# Patient Record
Sex: FEMALE | Race: WHITE | NOT HISPANIC OR LATINO | URBAN - METROPOLITAN AREA
[De-identification: names, ages, dates, MRNs, and addresses within clinical notes are randomized per-mention and may not be internally consistent; named-entity substitution may affect disease eponyms.]

---

## 2017-05-29 ENCOUNTER — EMERGENCY (EMERGENCY)
Facility: HOSPITAL | Age: 27
LOS: 0 days | Discharge: HOME | End: 2017-05-29

## 2017-06-28 DIAGNOSIS — R11.0 NAUSEA: ICD-10-CM

## 2017-06-28 DIAGNOSIS — R10.13 EPIGASTRIC PAIN: ICD-10-CM

## 2017-06-28 DIAGNOSIS — R19.7 DIARRHEA, UNSPECIFIED: ICD-10-CM

## 2017-06-28 DIAGNOSIS — Z87.891 PERSONAL HISTORY OF NICOTINE DEPENDENCE: ICD-10-CM

## 2018-08-28 PROBLEM — Z00.00 ENCOUNTER FOR PREVENTIVE HEALTH EXAMINATION: Status: ACTIVE | Noted: 2018-08-28

## 2018-09-13 ENCOUNTER — APPOINTMENT (OUTPATIENT)
Dept: OBGYN | Facility: CLINIC | Age: 28
End: 2018-09-13

## 2018-09-13 ENCOUNTER — OUTPATIENT (OUTPATIENT)
Dept: OUTPATIENT SERVICES | Facility: HOSPITAL | Age: 28
LOS: 1 days | Discharge: HOME | End: 2018-09-13

## 2018-09-13 ENCOUNTER — NON-APPOINTMENT (OUTPATIENT)
Age: 28
End: 2018-09-13

## 2018-09-13 VITALS
DIASTOLIC BLOOD PRESSURE: 60 MMHG | BODY MASS INDEX: 27.29 KG/M2 | WEIGHT: 154 LBS | HEIGHT: 63 IN | SYSTOLIC BLOOD PRESSURE: 100 MMHG

## 2018-09-13 DIAGNOSIS — K59.00 CONSTIPATION, UNSPECIFIED: ICD-10-CM

## 2018-09-13 DIAGNOSIS — O35.8XX1 MATERNAL CARE FOR OTHER (SUSPECTED) FETAL ABNORMALITY AND DAMAGE, FETUS 1: ICD-10-CM

## 2018-09-13 DIAGNOSIS — Z34.90 ENCOUNTER FOR SUPERVISION OF NORMAL PREGNANCY, UNSPECIFIED, UNSPECIFIED TRIMESTER: ICD-10-CM

## 2018-09-14 ENCOUNTER — APPOINTMENT (OUTPATIENT)
Dept: OBGYN | Facility: CLINIC | Age: 28
End: 2018-09-14

## 2018-09-16 LAB
BILIRUB UR QL STRIP: NORMAL
C TRACH RRNA SPEC QL NAA+PROBE: NOT DETECTED
CLARITY UR: CLEAR
COLLECTION METHOD: NORMAL
GLUCOSE UR-MCNC: NORMAL
HCG UR QL: 0.2 EU/DL
HGB UR QL STRIP.AUTO: NORMAL
KETONES UR-MCNC: NORMAL
LEUKOCYTE ESTERASE UR QL STRIP: NORMAL
N GONORRHOEA RRNA SPEC QL NAA+PROBE: NOT DETECTED
NITRITE UR QL STRIP: NORMAL
PH UR STRIP: 7
PROT UR STRIP-MCNC: NORMAL
SOURCE AMPLIFICATION: NORMAL
SP GR UR STRIP: 1.01

## 2018-10-01 ENCOUNTER — APPOINTMENT (OUTPATIENT)
Dept: ANTEPARTUM | Facility: CLINIC | Age: 28
End: 2018-10-01

## 2018-10-03 LAB
ABO + RH PNL BLD: NORMAL
BACTERIA UR CULT: NORMAL
BASOPHILS # BLD AUTO: 0.02 K/UL
BASOPHILS NFR BLD AUTO: 0.2 %
BLD GP AB SCN SERPL QL: NORMAL
EOSINOPHIL # BLD AUTO: 0.07 K/UL
EOSINOPHIL NFR BLD AUTO: 0.7 %
HCT VFR BLD CALC: 40.1 %
HGB BLD-MCNC: 12.9 G/DL
IMM GRANULOCYTES NFR BLD AUTO: 0.4 %
LEAD BLD-MCNC: <1 UG/DL
LYMPHOCYTES # BLD AUTO: 1.45 K/UL
LYMPHOCYTES NFR BLD AUTO: 14.4 %
MAN DIFF?: NORMAL
MCHC RBC-ENTMCNC: 28.5 PG
MCHC RBC-ENTMCNC: 32.2 G/DL
MCV RBC AUTO: 88.5 FL
MONOCYTES # BLD AUTO: 0.47 K/UL
MONOCYTES NFR BLD AUTO: 4.7 %
NEUTROPHILS # BLD AUTO: 8.05 K/UL
NEUTROPHILS NFR BLD AUTO: 79.6 %
PLATELET # BLD AUTO: 267 K/UL
RBC # BLD: 4.53 M/UL
RBC # FLD: 13.1 %
T PALLIDUM AB SER QL IA: NEGATIVE
VZV AB TITR SER: POSITIVE
VZV IGG SER IF-ACNC: 589.9 INDEX
WBC # FLD AUTO: 10.1 K/UL

## 2018-10-04 LAB
AR GENE MUT ANL BLD/T: NEGATIVE
CFTR MUT TESTED BLD/T: NEGATIVE

## 2018-10-11 ENCOUNTER — NON-APPOINTMENT (OUTPATIENT)
Age: 28
End: 2018-10-11

## 2018-10-11 ENCOUNTER — OUTPATIENT (OUTPATIENT)
Dept: OUTPATIENT SERVICES | Facility: HOSPITAL | Age: 28
LOS: 1 days | Discharge: HOME | End: 2018-10-11

## 2018-10-11 ENCOUNTER — APPOINTMENT (OUTPATIENT)
Dept: OBGYN | Facility: CLINIC | Age: 28
End: 2018-10-11

## 2018-10-11 ENCOUNTER — RESULT CHARGE (OUTPATIENT)
Age: 28
End: 2018-10-11

## 2018-10-11 VITALS — SYSTOLIC BLOOD PRESSURE: 98 MMHG | DIASTOLIC BLOOD PRESSURE: 62 MMHG | WEIGHT: 155 LBS | BODY MASS INDEX: 27.46 KG/M2

## 2018-10-11 DIAGNOSIS — Z34.82 ENCOUNTER FOR SUPERVISION OF OTHER NORMAL PREGNANCY, SECOND TRIMESTER: ICD-10-CM

## 2018-10-11 DIAGNOSIS — Z3A.27 27 WEEKS GESTATION OF PREGNANCY: ICD-10-CM

## 2018-10-15 LAB
BILIRUB UR QL STRIP: NEGATIVE
CLARITY UR: CLEAR
COLLECTION METHOD: NORMAL
GLUCOSE UR-MCNC: NEGATIVE
HCG UR QL: NEGATIVE EU/DL
HGB UR QL STRIP.AUTO: NEGATIVE
KETONES UR-MCNC: NEGATIVE
LEUKOCYTE ESTERASE UR QL STRIP: NEGATIVE
NITRITE UR QL STRIP: NEGATIVE
PH UR STRIP: 7
PROT UR STRIP-MCNC: NEGATIVE
SP GR UR STRIP: 1.03

## 2018-10-16 LAB — GLUCOSE 1H P 50 G GLC PO SERPL-MCNC: 98 MG/DL

## 2018-10-25 ENCOUNTER — APPOINTMENT (OUTPATIENT)
Dept: OBGYN | Facility: CLINIC | Age: 28
End: 2018-10-25

## 2018-10-25 ENCOUNTER — RESULT CHARGE (OUTPATIENT)
Age: 28
End: 2018-10-25

## 2018-10-25 ENCOUNTER — OUTPATIENT (OUTPATIENT)
Dept: OUTPATIENT SERVICES | Facility: HOSPITAL | Age: 28
LOS: 1 days | Discharge: HOME | End: 2018-10-25

## 2018-10-25 ENCOUNTER — NON-APPOINTMENT (OUTPATIENT)
Age: 28
End: 2018-10-25

## 2018-10-25 VITALS — WEIGHT: 158 LBS | DIASTOLIC BLOOD PRESSURE: 56 MMHG | BODY MASS INDEX: 27.99 KG/M2 | SYSTOLIC BLOOD PRESSURE: 90 MMHG

## 2018-10-30 DIAGNOSIS — Z3A.29 29 WEEKS GESTATION OF PREGNANCY: ICD-10-CM

## 2018-10-30 DIAGNOSIS — Z34.83 ENCOUNTER FOR SUPERVISION OF OTHER NORMAL PREGNANCY, THIRD TRIMESTER: ICD-10-CM

## 2018-10-30 DIAGNOSIS — O36.8130 DECREASED FETAL MOVEMENTS, THIRD TRIMESTER, NOT APPLICABLE OR UNSPECIFIED: ICD-10-CM

## 2018-10-30 LAB
BILIRUB UR QL STRIP: NEGATIVE
CLARITY UR: CLEAR
COLLECTION METHOD: NORMAL
GLUCOSE UR-MCNC: NEGATIVE
HCG UR QL: NEGATIVE EU/DL
HGB UR QL STRIP.AUTO: NEGATIVE
KETONES UR-MCNC: NEGATIVE
LEUKOCYTE ESTERASE UR QL STRIP: NEGATIVE
NITRITE UR QL STRIP: NEGATIVE
PH UR STRIP: 6
PROT UR STRIP-MCNC: NEGATIVE
SP GR UR STRIP: 1.02

## 2018-11-15 ENCOUNTER — OUTPATIENT (OUTPATIENT)
Dept: OUTPATIENT SERVICES | Facility: HOSPITAL | Age: 28
LOS: 1 days | Discharge: HOME | End: 2018-11-15

## 2018-11-15 ENCOUNTER — APPOINTMENT (OUTPATIENT)
Dept: OBGYN | Facility: CLINIC | Age: 28
End: 2018-11-15

## 2018-11-15 ENCOUNTER — NON-APPOINTMENT (OUTPATIENT)
Age: 28
End: 2018-11-15

## 2018-11-15 ENCOUNTER — RESULT CHARGE (OUTPATIENT)
Age: 28
End: 2018-11-15

## 2018-11-15 VITALS
SYSTOLIC BLOOD PRESSURE: 120 MMHG | DIASTOLIC BLOOD PRESSURE: 60 MMHG | WEIGHT: 166 LBS | HEIGHT: 63 IN | BODY MASS INDEX: 29.41 KG/M2

## 2018-11-18 LAB
BILIRUB UR QL STRIP: NORMAL
CLARITY UR: CLEAR
COLLECTION METHOD: NORMAL
GLUCOSE UR-MCNC: NORMAL
HCG UR QL: 0.2 EU/DL
HGB UR QL STRIP.AUTO: NORMAL
KETONES UR-MCNC: NORMAL
LEUKOCYTE ESTERASE UR QL STRIP: NORMAL
NITRITE UR QL STRIP: NORMAL
PH UR STRIP: 6
PROT UR STRIP-MCNC: NORMAL
SP GR UR STRIP: 1.01

## 2018-11-28 DIAGNOSIS — Z3A.32 32 WEEKS GESTATION OF PREGNANCY: ICD-10-CM

## 2018-11-28 DIAGNOSIS — Z34.83 ENCOUNTER FOR SUPERVISION OF OTHER NORMAL PREGNANCY, THIRD TRIMESTER: ICD-10-CM

## 2018-11-29 ENCOUNTER — APPOINTMENT (OUTPATIENT)
Dept: OBGYN | Facility: CLINIC | Age: 28
End: 2018-11-29

## 2018-12-03 ENCOUNTER — OUTPATIENT (OUTPATIENT)
Dept: OUTPATIENT SERVICES | Facility: HOSPITAL | Age: 28
LOS: 1 days | Discharge: HOME | End: 2018-12-03

## 2018-12-03 ENCOUNTER — APPOINTMENT (OUTPATIENT)
Dept: ANTEPARTUM | Facility: CLINIC | Age: 28
End: 2018-12-03

## 2018-12-13 ENCOUNTER — OUTPATIENT (OUTPATIENT)
Dept: OUTPATIENT SERVICES | Facility: HOSPITAL | Age: 28
LOS: 1 days | Discharge: HOME | End: 2018-12-13

## 2018-12-13 ENCOUNTER — APPOINTMENT (OUTPATIENT)
Dept: OBGYN | Facility: CLINIC | Age: 28
End: 2018-12-13

## 2018-12-13 VITALS — BODY MASS INDEX: 31 KG/M2 | SYSTOLIC BLOOD PRESSURE: 100 MMHG | DIASTOLIC BLOOD PRESSURE: 70 MMHG | WEIGHT: 175 LBS

## 2018-12-14 DIAGNOSIS — Z34.90 ENCOUNTER FOR SUPERVISION OF NORMAL PREGNANCY, UNSPECIFIED, UNSPECIFIED TRIMESTER: ICD-10-CM

## 2018-12-14 DIAGNOSIS — Z34.83 ENCOUNTER FOR SUPERVISION OF OTHER NORMAL PREGNANCY, THIRD TRIMESTER: ICD-10-CM

## 2018-12-14 DIAGNOSIS — Z3A.36 36 WEEKS GESTATION OF PREGNANCY: ICD-10-CM

## 2018-12-14 LAB
BILIRUB UR QL STRIP: NORMAL
CLARITY UR: CLEAR
COLLECTION METHOD: NORMAL
GLUCOSE UR-MCNC: NORMAL
HCG UR QL: 0.2 EU/DL
HGB UR QL STRIP.AUTO: NORMAL
KETONES UR-MCNC: NORMAL
LEUKOCYTE ESTERASE UR QL STRIP: NORMAL
NITRITE UR QL STRIP: NORMAL
PH UR STRIP: 6.5
PROT UR STRIP-MCNC: NORMAL
SP GR UR STRIP: 1.03

## 2018-12-21 ENCOUNTER — APPOINTMENT (OUTPATIENT)
Dept: OBGYN | Facility: CLINIC | Age: 28
End: 2018-12-21

## 2018-12-21 ENCOUNTER — NON-APPOINTMENT (OUTPATIENT)
Age: 28
End: 2018-12-21

## 2018-12-21 ENCOUNTER — OUTPATIENT (OUTPATIENT)
Dept: OUTPATIENT SERVICES | Facility: HOSPITAL | Age: 28
LOS: 1 days | Discharge: HOME | End: 2018-12-21

## 2018-12-21 VITALS — BODY MASS INDEX: 30.82 KG/M2 | DIASTOLIC BLOOD PRESSURE: 60 MMHG | SYSTOLIC BLOOD PRESSURE: 100 MMHG | WEIGHT: 174 LBS

## 2018-12-21 LAB
BILIRUB UR QL STRIP: NEGATIVE
CLARITY UR: CLEAR
COLLECTION METHOD: NORMAL
GLUCOSE UR-MCNC: NEGATIVE
HCG UR QL: NORMAL EU/DL
HGB UR QL STRIP.AUTO: NEGATIVE
KETONES UR-MCNC: NEGATIVE
LEUKOCYTE ESTERASE UR QL STRIP: NORMAL
NITRITE UR QL STRIP: NEGATIVE
PH UR STRIP: 8
PROT UR STRIP-MCNC: NEGATIVE
SP GR UR STRIP: 1.02

## 2018-12-26 DIAGNOSIS — Z34.83 ENCOUNTER FOR SUPERVISION OF OTHER NORMAL PREGNANCY, THIRD TRIMESTER: ICD-10-CM

## 2018-12-26 LAB
C TRACH RRNA SPEC QL NAA+PROBE: NOT DETECTED
GP B STREP DNA SPEC QL NAA+PROBE: NORMAL
GP B STREP DNA SPEC QL NAA+PROBE: NOT DETECTED
N GONORRHOEA RRNA SPEC QL NAA+PROBE: NOT DETECTED
SOURCE AMPLIFICATION: NORMAL
SOURCE GBS: NORMAL

## 2018-12-27 ENCOUNTER — OUTPATIENT (OUTPATIENT)
Dept: OUTPATIENT SERVICES | Facility: HOSPITAL | Age: 28
LOS: 1 days | Discharge: HOME | End: 2018-12-27

## 2018-12-27 ENCOUNTER — NON-APPOINTMENT (OUTPATIENT)
Age: 28
End: 2018-12-27

## 2018-12-27 ENCOUNTER — APPOINTMENT (OUTPATIENT)
Dept: OBGYN | Facility: CLINIC | Age: 28
End: 2018-12-27

## 2018-12-27 ENCOUNTER — RESULT CHARGE (OUTPATIENT)
Age: 28
End: 2018-12-27

## 2018-12-27 VITALS
HEART RATE: 88 BPM | TEMPERATURE: 98 F | SYSTOLIC BLOOD PRESSURE: 106 MMHG | DIASTOLIC BLOOD PRESSURE: 72 MMHG | RESPIRATION RATE: 17 BRPM

## 2018-12-27 VITALS — WEIGHT: 177 LBS | BODY MASS INDEX: 31.35 KG/M2 | DIASTOLIC BLOOD PRESSURE: 60 MMHG | SYSTOLIC BLOOD PRESSURE: 100 MMHG

## 2018-12-27 VITALS — DIASTOLIC BLOOD PRESSURE: 72 MMHG | HEART RATE: 88 BPM | SYSTOLIC BLOOD PRESSURE: 106 MMHG

## 2018-12-27 DIAGNOSIS — Z34.83 ENCOUNTER FOR SUPERVISION OF OTHER NORMAL PREGNANCY, THIRD TRIMESTER: ICD-10-CM

## 2018-12-27 DIAGNOSIS — Z98.82 BREAST IMPLANT STATUS: Chronic | ICD-10-CM

## 2018-12-27 DIAGNOSIS — Z3A.38 38 WEEKS GESTATION OF PREGNANCY: ICD-10-CM

## 2018-12-27 DIAGNOSIS — Z98.890 OTHER SPECIFIED POSTPROCEDURAL STATES: Chronic | ICD-10-CM

## 2018-12-27 NOTE — OB PROVIDER TRIAGE NOTE - NSHPPHYSICALEXAM_GEN_ALL_CORE
Vital Signs Last 24 Hrs  T(F): 98.4 (27 Dec 2018 11:46), Max: 98.4 (27 Dec 2018 11:46)  HR: 88 (27 Dec 2018 11:49) (88 - 88)  BP: 106/72 (27 Dec 2018 11:49) (106/72 - 106/72)  RR: 17 (27 Dec 2018 11:46) (17 - 17)    Udip: positive leuks    Gen: aaox3, nad  Abd: soft, nontender, gravid  Speculum: small amount of physiologic, white discharge. No abnormal lesions. No bleeding. no gross pooling. Nitrazine negative. Ferning negative. Vital Signs Last 24 Hrs  T(F): 98.4 (27 Dec 2018 11:46), Max: 98.4 (27 Dec 2018 11:46)  HR: 88 (27 Dec 2018 11:49) (88 - 88)  BP: 106/72 (27 Dec 2018 11:49) (106/72 - 106/72)  RR: 17 (27 Dec 2018 11:46) (17 - 17)    ROS: Negative for constituional, heent, cardio, resp, gi, gu, ext, skin, neuro, hematologic, psychiatric symptoms  Udip: positive leuks    Gen: aaox3, nad  Abd: soft, nontender, gravid  Speculum: small amount of physiologic, white discharge. No abnormal lesions. No bleeding. no gross pooling. Nitrazine negative. Ferning negative.

## 2018-12-27 NOTE — OB PROVIDER TRIAGE NOTE - NSOBPROVIDERNOTE_OBGYN_ALL_OB_FT
27 yo  at 38w0d GA by LMP c/w first trimester sono, late transfer at 23 weeks from [place], GBS negative, intact membranes, now feeling fetal movement, BPP 10/10, with fetal and maternal well being assured.  -d/c home, instructions and precautions given  -f/u in clinic in 1 week as scheduled    Discussed with Dr. Rodriguez and Dr. Bermudez 27 yo  at 38w0d GA by LMP c/w first trimester sono, late transfer at 23 weeks from [place], GBS negative, intact membranes, now feeling fetal movement, BPP 10/10, with fetal and maternal well being assured.  -d/c home, instructions and precautions given  -f/u in clinic in 1 week as scheduled    Discussed with Dr. Rodriguez and Dr. Bermudez    Attending: Pt seen and evaluated with resident and agree with resident note and plan of care    nst reactive  mvp 5cm on sonogram

## 2018-12-27 NOTE — OB PROVIDER TRIAGE NOTE - NSHPLABSRESULTS_GEN_ALL_CORE
Labs:  GBS negative 12/14/2018  Varicella Immune  Rubella immune  RPR NR  SMA neg  CF neg  O positive antibody negative  GCT 98  Hemoglobin electrophoresis normal  HepB NR  HIV NR    Sonos:  25w4d EFW 690g (32%ile), cephalic, anterior placenta, MVP 56mm  34w4d EFW 2354g (39%ile), cephalic, anterior placenta, MVP 53mm, BPP 8/8

## 2018-12-27 NOTE — OB PROVIDER TRIAGE NOTE - HISTORY OF PRESENT ILLNESS
27 yo  at 38w0d GA by LMP c/w first trimester sono, late transfer at 23 weeks from [place] presents for decreased fetal movement and leakage of clear fluid since , small leaks throughout the days. She denies VB or contractions. She reports fewer fetal movements than usual over the last 1-2 days. Since arriving at triage, she has felt regular fetal movement. She denies issues this pregnancy. Last examined in clinic 1cm dilated.

## 2019-01-01 ENCOUNTER — OUTPATIENT (OUTPATIENT)
Dept: OUTPATIENT SERVICES | Facility: HOSPITAL | Age: 29
LOS: 1 days | End: 2019-01-01
Payer: MEDICAID

## 2019-01-01 DIAGNOSIS — Z98.890 OTHER SPECIFIED POSTPROCEDURAL STATES: Chronic | ICD-10-CM

## 2019-01-01 DIAGNOSIS — Z98.82 BREAST IMPLANT STATUS: Chronic | ICD-10-CM

## 2019-01-01 PROCEDURE — G9001: CPT

## 2019-01-03 ENCOUNTER — RESULT CHARGE (OUTPATIENT)
Age: 29
End: 2019-01-03

## 2019-01-03 ENCOUNTER — NON-APPOINTMENT (OUTPATIENT)
Age: 29
End: 2019-01-03

## 2019-01-03 ENCOUNTER — OUTPATIENT (OUTPATIENT)
Dept: OUTPATIENT SERVICES | Facility: HOSPITAL | Age: 29
LOS: 1 days | Discharge: HOME | End: 2019-01-03

## 2019-01-03 ENCOUNTER — APPOINTMENT (OUTPATIENT)
Dept: OBGYN | Facility: CLINIC | Age: 29
End: 2019-01-03

## 2019-01-03 VITALS — WEIGHT: 177 LBS | BODY MASS INDEX: 31.35 KG/M2 | SYSTOLIC BLOOD PRESSURE: 90 MMHG | DIASTOLIC BLOOD PRESSURE: 60 MMHG

## 2019-01-03 DIAGNOSIS — Z98.82 BREAST IMPLANT STATUS: Chronic | ICD-10-CM

## 2019-01-03 DIAGNOSIS — Z98.890 OTHER SPECIFIED POSTPROCEDURAL STATES: Chronic | ICD-10-CM

## 2019-01-04 DIAGNOSIS — Z34.83 ENCOUNTER FOR SUPERVISION OF OTHER NORMAL PREGNANCY, THIRD TRIMESTER: ICD-10-CM

## 2019-01-04 DIAGNOSIS — Z3A.39 39 WEEKS GESTATION OF PREGNANCY: ICD-10-CM

## 2019-01-05 LAB
BASOPHILS # BLD AUTO: 0.02 K/UL
BASOPHILS NFR BLD AUTO: 0.2 %
EOSINOPHIL # BLD AUTO: 0.05 K/UL
EOSINOPHIL NFR BLD AUTO: 0.4 %
HCT VFR BLD CALC: 39.9 %
HGB BLD-MCNC: 13.1 G/DL
HIV1+2 AB SPEC QL IA.RAPID: NONREACTIVE
IMM GRANULOCYTES NFR BLD AUTO: 0.3 %
LYMPHOCYTES # BLD AUTO: 1.61 K/UL
LYMPHOCYTES NFR BLD AUTO: 13.3 %
MAN DIFF?: NORMAL
MCHC RBC-ENTMCNC: 29 PG
MCHC RBC-ENTMCNC: 32.8 G/DL
MCV RBC AUTO: 88.5 FL
MONOCYTES # BLD AUTO: 0.83 K/UL
MONOCYTES NFR BLD AUTO: 6.9 %
NEUTROPHILS # BLD AUTO: 9.52 K/UL
NEUTROPHILS NFR BLD AUTO: 78.9 %
PLATELET # BLD AUTO: 219 K/UL
RBC # BLD: 4.51 M/UL
RBC # FLD: 12.9 %
WBC # FLD AUTO: 12.07 K/UL

## 2019-01-10 ENCOUNTER — RESULT CHARGE (OUTPATIENT)
Age: 29
End: 2019-01-10

## 2019-01-10 ENCOUNTER — NON-APPOINTMENT (OUTPATIENT)
Age: 29
End: 2019-01-10

## 2019-01-10 ENCOUNTER — APPOINTMENT (OUTPATIENT)
Dept: OBGYN | Facility: CLINIC | Age: 29
End: 2019-01-10

## 2019-01-10 ENCOUNTER — OUTPATIENT (OUTPATIENT)
Dept: OUTPATIENT SERVICES | Facility: HOSPITAL | Age: 29
LOS: 1 days | Discharge: HOME | End: 2019-01-10

## 2019-01-10 VITALS — SYSTOLIC BLOOD PRESSURE: 100 MMHG | WEIGHT: 178 LBS | BODY MASS INDEX: 31.53 KG/M2 | DIASTOLIC BLOOD PRESSURE: 60 MMHG

## 2019-01-10 DIAGNOSIS — Z98.82 BREAST IMPLANT STATUS: Chronic | ICD-10-CM

## 2019-01-10 DIAGNOSIS — Z98.890 OTHER SPECIFIED POSTPROCEDURAL STATES: Chronic | ICD-10-CM

## 2019-01-11 ENCOUNTER — INPATIENT (INPATIENT)
Facility: HOSPITAL | Age: 29
LOS: 2 days | Discharge: HOME | End: 2019-01-14
Attending: OBSTETRICS & GYNECOLOGY | Admitting: OBSTETRICS & GYNECOLOGY

## 2019-01-11 ENCOUNTER — OUTPATIENT (OUTPATIENT)
Dept: OUTPATIENT SERVICES | Facility: HOSPITAL | Age: 29
LOS: 1 days | Discharge: HOME | End: 2019-01-11

## 2019-01-11 VITALS
DIASTOLIC BLOOD PRESSURE: 77 MMHG | SYSTOLIC BLOOD PRESSURE: 128 MMHG | TEMPERATURE: 98 F | HEART RATE: 106 BPM | RESPIRATION RATE: 20 BRPM

## 2019-01-11 VITALS — TEMPERATURE: 98 F

## 2019-01-11 DIAGNOSIS — Z34.83 ENCOUNTER FOR SUPERVISION OF OTHER NORMAL PREGNANCY, THIRD TRIMESTER: ICD-10-CM

## 2019-01-11 DIAGNOSIS — Z98.890 OTHER SPECIFIED POSTPROCEDURAL STATES: Chronic | ICD-10-CM

## 2019-01-11 DIAGNOSIS — Z98.82 BREAST IMPLANT STATUS: Chronic | ICD-10-CM

## 2019-01-11 DIAGNOSIS — Z3A.40 40 WEEKS GESTATION OF PREGNANCY: ICD-10-CM

## 2019-01-11 NOTE — OB PROVIDER TRIAGE NOTE - NSNYCREQUIREMENTS_OBGYN_ALL_OB
Principal Discharge DX:	Normal spontaneous vaginal delivery  Goal:	ob check in dr eddy office in 5weeks call to set up appointment  Assessment and plan of treatment:	no sex nothing in vagina no heavy lifting no pushing no straining no strenuous activities  pain medication as needed; stool softener; dulcolax as needed if constipated  walk for exercise: helps recovery   continue prenatal vitamins daily especially whole course of breastfeeding  see your OB in the office for follow up post partum check 4-5weeks call the office to set up an appointment
[facility]

## 2019-01-11 NOTE — OB PROVIDER TRIAGE NOTE - NSOBPROVIDERNOTE_OBGYN_ALL_OB_FT
27yo  at 40w1d, GBS neg, not in labor, 27yo  at 40w1d, GBS neg, not in labor, maternal and fetal status reassuring, BPP     -Discharge patient home  -Labor precautions given  -FKC given 27yo  at 40w1d, GBS neg, not in labor, maternal and fetal status reassuring, BPP     -Discharge patient home  -Labor precautions given  -FKC given  -Induction scheduled on 19, sonogram on 19    Dr. Rodriguez and Dr. Rodriguez aware

## 2019-01-11 NOTE — OB PROVIDER TRIAGE NOTE - NS_OBGYNHISTORY_OBGYN_ALL_OB_FT
Primigravid    Gynhx: Primigravid    Gynhx: Denies hx of fibroids, ovarian cysts, abnormal pap smears or STI's

## 2019-01-11 NOTE — OB PROVIDER TRIAGE NOTE - NSHPPHYSICALEXAM_GEN_ALL_CORE
Physical exam:    Vital Signs Last 24 Hrs  T(F): 97.9 (11 Jan 2019 17:20), Max: 97.9 (11 Jan 2019 17:13)  HR: 106 (11 Jan 2019 17:20) (106 - 106)  BP: 128/77 (11 Jan 2019 17:20) (128/77 - 128/77)  RR: 20 (11 Jan 2019 17:20) (20 - 20)  Udip mod blood  Gen: NAD  Abdomen: Soft, nontender, gravid. EFW 3600g. mild palpable contractions  VE: 2/70/-2 vertex, intact  EFM:   toco:   Sono Physical exam:    Vital Signs Last 24 Hrs  T(F): 97.9 (11 Jan 2019 17:20), Max: 97.9 (11 Jan 2019 17:13)  HR: 106 (11 Jan 2019 17:20) (106 - 106)  BP: 128/77 (11 Jan 2019 17:20) (128/77 - 128/77)  RR: 20 (11 Jan 2019 17:20) (20 - 20)  Udip mod blood  Gen: NAD  Abdomen: Soft, nontender, gravid. EFW 3600g. mild palpable contractions  VE: 2/70/-2 vertex, intact  EFM:   toco:   Sono: vertex, ant placenta, MVP 7.14cm, BPP 8/8 Physical exam:    Vital Signs Last 24 Hrs  T(F): 97.9 (11 Jan 2019 17:20), Max: 97.9 (11 Jan 2019 17:13)  HR: 106 (11 Jan 2019 17:20) (106 - 106)  BP: 128/77 (11 Jan 2019 17:20) (128/77 - 128/77)  RR: 20 (11 Jan 2019 17:20) (20 - 20)  Udip mod blood  Gen: NAD  Abdomen: Soft, nontender, gravid. EFW 3600g. mild palpable contractions  VE: 2/70/-2 vertex, intact  EFM: 130/mod/+Accels  toco: q 2 -5min irregular  Sono: vertex, ant placenta, MVP 7.14cm, BPP 8/8 Physical exam:    Vital Signs Last 24 Hrs  T(F): 97.9 (11 Jan 2019 17:20), Max: 97.9 (11 Jan 2019 17:13)  HR: 106 (11 Jan 2019 17:20) (106 - 106) HR 80bpm by manual  BP: 128/77 (11 Jan 2019 17:20) (128/77 - 128/77)  RR: 20 (11 Jan 2019 17:20) (20 - 20)  Udip mod blood  Gen: NAD  Abdomen: Soft, nontender, gravid. EFW 3600g. mild palpable contractions  VE: 2/70/-2 vertex, intact  EFM: 130/mod/+Accels  toco: q 2 -5min irregular  Sono: vertex, ant placenta, MVP 7.14cm, BPP 8/8

## 2019-01-11 NOTE — OB PROVIDER TRIAGE NOTE - HISTORY OF PRESENT ILLNESS
27yo  at 40w1d by LMP c/w first trimester sonogram, presents to labor and delivery for contractions that started yesterday at 0300am q30min, minimal pain. Today at 0200pm, pain started increasing in intensity and frequency. Now every 8min, 8/10 in intensity. She reports vaginal spotting soiling underwear since her vaginal exam yesterday. SVE on 1/10 she was 2cm. Denies nayeli vaginal bleeding or LOF. Good fetal movement. Denies any complications in this pregnancy. GBS negative.

## 2019-01-11 NOTE — OB PROVIDER TRIAGE NOTE - ADDITIONAL INSTRUCTIONS
-Labor precautions given  -Fetal kick count counseling emphasized  -Induction scheduled on 1/17/19, sonogram on 1/14/19

## 2019-01-11 NOTE — OB PROVIDER TRIAGE NOTE - NSHPLABSRESULTS_GEN_ALL_CORE
Type and Screen: O pos  Antibody screen: neg   Rubella: immune  Varicella: immune   RPR: neg  HbSAg: neg  HIV: NR  Gonorrhea: neg   Chlamydia: neg    Second Trimester:  1 hour Glucola: 98    Third Trimester:  HIV: NR 01/3/18  Gonorrhea: neg  Chlamydia: neg  GBS: neg     34w4d EFW 2354g, cephalic, ant placenta, ant placenta, MVP 53mm, BPP 8/8  254w4, EFW 690g (32%), cephalic, ant placenta, MVP 56mm Type and Screen: O pos  Antibody screen: neg   Rubella: immune  Varicella: immune   RPR: neg  HbSAg: neg  HIV: NR  Gonorrhea: neg   Chlamydia: neg    Second Trimester:  1 hour Glucola: 98    Third Trimester:  HIV: NR 01/3/18  Gonorrhea: neg  Chlamydia: neg  GBS: neg     34w4d EFW 2354g, cephalic, ant placenta, ant placenta, MVP 53mm, BPP 8/8  25w4, EFW 690g (32%), cephalic, ant placenta, MVP 56mm

## 2019-01-12 LAB
BASOPHILS # BLD AUTO: 0.02 K/UL — SIGNIFICANT CHANGE UP (ref 0–0.2)
BASOPHILS # BLD AUTO: 0.02 K/UL — SIGNIFICANT CHANGE UP (ref 0–0.2)
BASOPHILS NFR BLD AUTO: 0.1 % — SIGNIFICANT CHANGE UP (ref 0–1)
BASOPHILS NFR BLD AUTO: 0.1 % — SIGNIFICANT CHANGE UP (ref 0–1)
BLD GP AB SCN SERPL QL: SIGNIFICANT CHANGE UP
EOSINOPHIL # BLD AUTO: 0.01 K/UL — SIGNIFICANT CHANGE UP (ref 0–0.7)
EOSINOPHIL # BLD AUTO: 0.04 K/UL — SIGNIFICANT CHANGE UP (ref 0–0.7)
EOSINOPHIL NFR BLD AUTO: 0.1 % — SIGNIFICANT CHANGE UP (ref 0–8)
EOSINOPHIL NFR BLD AUTO: 0.3 % — SIGNIFICANT CHANGE UP (ref 0–8)
HCT VFR BLD CALC: 34.8 % — LOW (ref 37–47)
HCT VFR BLD CALC: 37.2 % — SIGNIFICANT CHANGE UP (ref 37–47)
HGB BLD-MCNC: 11.7 G/DL — LOW (ref 12–16)
HGB BLD-MCNC: 12.5 G/DL — SIGNIFICANT CHANGE UP (ref 12–16)
IMM GRANULOCYTES NFR BLD AUTO: 0.6 % — HIGH (ref 0.1–0.3)
IMM GRANULOCYTES NFR BLD AUTO: 0.8 % — HIGH (ref 0.1–0.3)
LYMPHOCYTES # BLD AUTO: 1.69 K/UL — SIGNIFICANT CHANGE UP (ref 1.2–3.4)
LYMPHOCYTES # BLD AUTO: 1.99 K/UL — SIGNIFICANT CHANGE UP (ref 1.2–3.4)
LYMPHOCYTES # BLD AUTO: 11.5 % — LOW (ref 20.5–51.1)
LYMPHOCYTES # BLD AUTO: 12.5 % — LOW (ref 20.5–51.1)
MCHC RBC-ENTMCNC: 28.7 PG — SIGNIFICANT CHANGE UP (ref 27–31)
MCHC RBC-ENTMCNC: 28.9 PG — SIGNIFICANT CHANGE UP (ref 27–31)
MCHC RBC-ENTMCNC: 33.6 G/DL — SIGNIFICANT CHANGE UP (ref 32–37)
MCHC RBC-ENTMCNC: 33.6 G/DL — SIGNIFICANT CHANGE UP (ref 32–37)
MCV RBC AUTO: 85.3 FL — SIGNIFICANT CHANGE UP (ref 81–99)
MCV RBC AUTO: 85.9 FL — SIGNIFICANT CHANGE UP (ref 81–99)
MONOCYTES # BLD AUTO: 0.54 K/UL — SIGNIFICANT CHANGE UP (ref 0.1–0.6)
MONOCYTES # BLD AUTO: 0.59 K/UL — SIGNIFICANT CHANGE UP (ref 0.1–0.6)
MONOCYTES NFR BLD AUTO: 3.7 % — SIGNIFICANT CHANGE UP (ref 1.7–9.3)
MONOCYTES NFR BLD AUTO: 3.7 % — SIGNIFICANT CHANGE UP (ref 1.7–9.3)
NEUTROPHILS # BLD AUTO: 12.38 K/UL — HIGH (ref 1.4–6.5)
NEUTROPHILS # BLD AUTO: 13.21 K/UL — HIGH (ref 1.4–6.5)
NEUTROPHILS NFR BLD AUTO: 82.6 % — HIGH (ref 42.2–75.2)
NEUTROPHILS NFR BLD AUTO: 84 % — HIGH (ref 42.2–75.2)
NRBC # BLD: 0 /100 WBCS — SIGNIFICANT CHANGE UP (ref 0–0)
NRBC # BLD: 0 /100 WBCS — SIGNIFICANT CHANGE UP (ref 0–0)
PLATELET # BLD AUTO: 236 K/UL — SIGNIFICANT CHANGE UP (ref 130–400)
PLATELET # BLD AUTO: 276 K/UL — SIGNIFICANT CHANGE UP (ref 130–400)
PRENATAL SYPHILIS TEST: SIGNIFICANT CHANGE UP
RBC # BLD: 4.05 M/UL — LOW (ref 4.2–5.4)
RBC # BLD: 4.36 M/UL — SIGNIFICANT CHANGE UP (ref 4.2–5.4)
RBC # FLD: 12.5 % — SIGNIFICANT CHANGE UP (ref 11.5–14.5)
RBC # FLD: 12.6 % — SIGNIFICANT CHANGE UP (ref 11.5–14.5)
TYPE + AB SCN PNL BLD: SIGNIFICANT CHANGE UP
WBC # BLD: 14.73 K/UL — HIGH (ref 4.8–10.8)
WBC # BLD: 15.97 K/UL — HIGH (ref 4.8–10.8)
WBC # FLD AUTO: 14.73 K/UL — HIGH (ref 4.8–10.8)
WBC # FLD AUTO: 15.97 K/UL — HIGH (ref 4.8–10.8)

## 2019-01-12 RX ORDER — DIPHENHYDRAMINE HCL 50 MG
25 CAPSULE ORAL EVERY 6 HOURS
Qty: 0 | Refills: 0 | Status: DISCONTINUED | OUTPATIENT
Start: 2019-01-12 | End: 2019-01-14

## 2019-01-12 RX ORDER — OXYTOCIN 10 UNIT/ML
41.67 VIAL (ML) INJECTION
Qty: 20 | Refills: 0 | Status: DISCONTINUED | OUTPATIENT
Start: 2019-01-12 | End: 2019-01-14

## 2019-01-12 RX ORDER — MAGNESIUM HYDROXIDE 400 MG/1
30 TABLET, CHEWABLE ORAL
Qty: 0 | Refills: 0 | Status: DISCONTINUED | OUTPATIENT
Start: 2019-01-12 | End: 2019-01-14

## 2019-01-12 RX ORDER — IBUPROFEN 200 MG
600 TABLET ORAL EVERY 6 HOURS
Qty: 0 | Refills: 0 | Status: DISCONTINUED | OUTPATIENT
Start: 2019-01-12 | End: 2019-01-14

## 2019-01-12 RX ORDER — OXYCODONE AND ACETAMINOPHEN 5; 325 MG/1; MG/1
2 TABLET ORAL EVERY 6 HOURS
Qty: 0 | Refills: 0 | Status: DISCONTINUED | OUTPATIENT
Start: 2019-01-12 | End: 2019-01-14

## 2019-01-12 RX ORDER — DIBUCAINE 1 %
1 OINTMENT (GRAM) RECTAL EVERY 4 HOURS
Qty: 0 | Refills: 0 | Status: DISCONTINUED | OUTPATIENT
Start: 2019-01-12 | End: 2019-01-14

## 2019-01-12 RX ORDER — SODIUM CHLORIDE 9 MG/ML
1000 INJECTION, SOLUTION INTRAVENOUS
Qty: 0 | Refills: 0 | Status: DISCONTINUED | OUTPATIENT
Start: 2019-01-12 | End: 2019-01-12

## 2019-01-12 RX ORDER — LANOLIN
1 OINTMENT (GRAM) TOPICAL EVERY 6 HOURS
Qty: 0 | Refills: 0 | Status: DISCONTINUED | OUTPATIENT
Start: 2019-01-12 | End: 2019-01-14

## 2019-01-12 RX ORDER — SODIUM CHLORIDE 9 MG/ML
3 INJECTION INTRAMUSCULAR; INTRAVENOUS; SUBCUTANEOUS EVERY 8 HOURS
Qty: 0 | Refills: 0 | Status: DISCONTINUED | OUTPATIENT
Start: 2019-01-12 | End: 2019-01-14

## 2019-01-12 RX ORDER — SIMETHICONE 80 MG/1
80 TABLET, CHEWABLE ORAL EVERY 6 HOURS
Qty: 0 | Refills: 0 | Status: DISCONTINUED | OUTPATIENT
Start: 2019-01-12 | End: 2019-01-14

## 2019-01-12 RX ORDER — DOCUSATE SODIUM 100 MG
100 CAPSULE ORAL
Qty: 0 | Refills: 0 | Status: DISCONTINUED | OUTPATIENT
Start: 2019-01-12 | End: 2019-01-14

## 2019-01-12 RX ORDER — SODIUM CHLORIDE 9 MG/ML
1000 INJECTION, SOLUTION INTRAVENOUS ONCE
Qty: 0 | Refills: 0 | Status: DISCONTINUED | OUTPATIENT
Start: 2019-01-12 | End: 2019-01-12

## 2019-01-12 RX ORDER — ACETAMINOPHEN 500 MG
650 TABLET ORAL EVERY 6 HOURS
Qty: 0 | Refills: 0 | Status: DISCONTINUED | OUTPATIENT
Start: 2019-01-12 | End: 2019-01-14

## 2019-01-12 RX ORDER — OXYTOCIN 10 UNIT/ML
125 VIAL (ML) INJECTION
Qty: 20 | Refills: 0 | Status: DISCONTINUED | OUTPATIENT
Start: 2019-01-12 | End: 2019-01-12

## 2019-01-12 RX ORDER — AER TRAVELER 0.5 G/1
1 SOLUTION RECTAL; TOPICAL EVERY 4 HOURS
Qty: 0 | Refills: 0 | Status: DISCONTINUED | OUTPATIENT
Start: 2019-01-12 | End: 2019-01-14

## 2019-01-12 RX ADMIN — Medication 600 MILLIGRAM(S): at 11:59

## 2019-01-12 RX ADMIN — Medication 650 MILLIGRAM(S): at 16:45

## 2019-01-12 RX ADMIN — SODIUM CHLORIDE 3 MILLILITER(S): 9 INJECTION INTRAMUSCULAR; INTRAVENOUS; SUBCUTANEOUS at 15:29

## 2019-01-12 RX ADMIN — SIMETHICONE 80 MILLIGRAM(S): 80 TABLET, CHEWABLE ORAL at 18:50

## 2019-01-12 RX ADMIN — Medication 600 MILLIGRAM(S): at 18:30

## 2019-01-12 RX ADMIN — Medication 600 MILLIGRAM(S): at 12:45

## 2019-01-12 RX ADMIN — Medication 1 TABLET(S): at 11:58

## 2019-01-12 RX ADMIN — SODIUM CHLORIDE 3 MILLILITER(S): 9 INJECTION INTRAMUSCULAR; INTRAVENOUS; SUBCUTANEOUS at 06:41

## 2019-01-12 RX ADMIN — Medication 600 MILLIGRAM(S): at 18:50

## 2019-01-12 RX ADMIN — Medication 100 MILLIGRAM(S): at 18:49

## 2019-01-12 RX ADMIN — Medication 600 MILLIGRAM(S): at 06:52

## 2019-01-12 RX ADMIN — Medication 650 MILLIGRAM(S): at 15:41

## 2019-01-12 NOTE — OB PROVIDER H&P - ASSESSMENT
27yo  at 40w2d GA, GBS neg, in active labor   - admit to L&D  - admission labs  - continuous EFM/toco  - clear liquids   - pain mgmt prn   - IV hydration    Dr. Lynn and Dr. candelaria aware.

## 2019-01-12 NOTE — OB PROVIDER H&P - NSHPLABSRESULTS_GEN_ALL_CORE
ega 25.4w- efw 690g (32%), cephalic, ant placenta, MVP 6.5cm   ega 34.4w- efw 2354g (39%) cephalic, ant placenta, MVP 5.3cm     GCT 98

## 2019-01-12 NOTE — OB PROVIDER DELIVERY SUMMARY - NSPROVIDERDELIVERYNOTE_OBGYN_ALL_OB_FT
Patient was fully dilated and pushing. Fetal head was OA and restituted to ROT. The anterior and posterior shoulders delivered, followed by the remaining body atraumatically. Delayed cord clamping was performed, and then clamped and cut. Cord blood gases collected x2. The  was handed to the mother and RN. The placenta delivered intact with membranes. Pitocin was administered without additional interventions. Uterus massaged, fundus found to be firm. Cervix, vagina and perineum inspected with 2nd degree laceration noted, repaired using 2-0 and 3-0 chromic in the usual fashion with good hemostasis.     Viable female infant delivered, weighing 3090grams, with APGARs 9/9    Laceration: 2nd degree  EBL 200cc    Dr. Lynn present for the delivery

## 2019-01-12 NOTE — OB PROVIDER H&P - HISTORY OF PRESENT ILLNESS
29yo  at 40w2d GA, by LMP c/w 1st trim sono, c/o ctx, q4min, 10/10 intensity. C/o vaginal spotting after pelvic exam earlier today- /-2. Denies nayeli VB/LOF. Good FM. Denies complications during this pregnancy. Late transfer from [place] at 22w GA.

## 2019-01-12 NOTE — OB PROVIDER H&P - NSHPPHYSICALEXAM_GEN_ALL_CORE
Vital Signs Last 24 Hrs  T(C): 36.7 (12 Jan 2019 00:01), Max: 36.7 (12 Jan 2019 00:01)  T(F): 98.1 (12 Jan 2019 00:01), Max: 98.1 (12 Jan 2019 00:01)  HR: 96 (12 Jan 2019 00:01) (96 - 106)  BP: 138/80 (12 Jan 2019 00:01) (128/77 - 138/80)  RR: 18 (12 Jan 2019 00:01) (18 - 20)    EFM: 125/mod/accels+  [place]: irreg ctx   Abd: soft, gravid, nontender, palpable ctx   SVE: 6/80/-2, vtx, intact

## 2019-01-13 RX ADMIN — SIMETHICONE 80 MILLIGRAM(S): 80 TABLET, CHEWABLE ORAL at 01:55

## 2019-01-13 RX ADMIN — Medication 600 MILLIGRAM(S): at 08:38

## 2019-01-13 RX ADMIN — Medication 650 MILLIGRAM(S): at 00:40

## 2019-01-13 RX ADMIN — Medication 600 MILLIGRAM(S): at 01:55

## 2019-01-13 RX ADMIN — Medication 600 MILLIGRAM(S): at 21:57

## 2019-01-13 RX ADMIN — Medication 1 TABLET(S): at 12:05

## 2019-01-13 RX ADMIN — Medication 600 MILLIGRAM(S): at 02:30

## 2019-01-13 RX ADMIN — Medication 600 MILLIGRAM(S): at 09:15

## 2019-01-13 RX ADMIN — SIMETHICONE 80 MILLIGRAM(S): 80 TABLET, CHEWABLE ORAL at 08:38

## 2019-01-13 RX ADMIN — Medication 600 MILLIGRAM(S): at 16:20

## 2019-01-13 RX ADMIN — SIMETHICONE 80 MILLIGRAM(S): 80 TABLET, CHEWABLE ORAL at 21:57

## 2019-01-13 RX ADMIN — Medication 650 MILLIGRAM(S): at 00:06

## 2019-01-13 RX ADMIN — Medication 1 APPLICATION(S): at 00:06

## 2019-01-13 NOTE — PROGRESS NOTE ADULT - ASSESSMENT
29yo  s/p uncomplicated  ppd1, doing well    -Pain management PRN  -PO hydration and regular diet  -Monitor vitals  -Monitor bleeding      Dr. Ratliff and Dr. Rodriguez to be made aware

## 2019-01-13 NOTE — PROGRESS NOTE ADULT - SUBJECTIVE AND OBJECTIVE BOX
Chief Complaint: Postpartum day1    HPI: Pt doing well, pain well controlled. No overnight events, no acute complaints. She is ambulating, tolerating regular diet, breastfeeding, voiding, passing flatus without difficulty. No BM yet. Denies chest pain, leg pain, SOB.    ROS: Denies cardiovascular or respiratory symptoms    PAST MEDICAL & SURGICAL HISTORY:  Anxiety  H/O rhinoplasty  H/O breast augmentation      Physical Exam  Vital Signs Last 24 Hrs  T(F): 97.8 (13 Jan 2019 08:03), Max: 97.8 (13 Jan 2019 08:03)  HR: 83 (13 Jan 2019 08:03) (83 - 88)  BP: 124/63 (13 Jan 2019 08:03) (124/63 - 130/72)  RR: 19 (13 Jan 2019 08:03) (18 - 19)    Physical exam:  General - AAOx3, NAD  Heart - S1S2, RRR  Lungs - CTA BL  Abdomen:  - Soft, nontender, nondistended, BS+  - Fundus firm, nontender, below the umbilicus  Pelvis/Vagina - minimal rubra noted  Extremities: No calf tenderness, no swelling    Labs:                        11.7   15.97 )-----------( 236      ( 12 Jan 2019 22:00 )             34.8                         12.5   14.73 )-----------( 276      ( 12 Jan 2019 00:55 )             37.2     Type + Screen: O POS (01-12-19 @ 00:55)  Antibody Screen: NEG (01-12-19 @ 00:55)

## 2019-01-14 ENCOUNTER — TRANSCRIPTION ENCOUNTER (OUTPATIENT)
Age: 29
End: 2019-01-14

## 2019-01-14 ENCOUNTER — APPOINTMENT (OUTPATIENT)
Dept: ANTEPARTUM | Facility: CLINIC | Age: 29
End: 2019-01-14

## 2019-01-14 VITALS
DIASTOLIC BLOOD PRESSURE: 67 MMHG | TEMPERATURE: 97 F | RESPIRATION RATE: 18 BRPM | HEART RATE: 80 BPM | SYSTOLIC BLOOD PRESSURE: 117 MMHG

## 2019-01-14 RX ORDER — IBUPROFEN 200 MG
1 TABLET ORAL
Qty: 0 | Refills: 0 | DISCHARGE
Start: 2019-01-14

## 2019-01-14 RX ORDER — PSEUDOEPHEDRINE HCL 30 MG
30 TABLET ORAL ONCE
Qty: 0 | Refills: 0 | Status: COMPLETED | OUTPATIENT
Start: 2019-01-14 | End: 2019-01-14

## 2019-01-14 RX ORDER — ACETAMINOPHEN 500 MG
2 TABLET ORAL
Qty: 0 | Refills: 0 | DISCHARGE
Start: 2019-01-14

## 2019-01-14 RX ORDER — DOCUSATE SODIUM 100 MG
1 CAPSULE ORAL
Qty: 0 | Refills: 0 | DISCHARGE
Start: 2019-01-14

## 2019-01-14 RX ADMIN — Medication 600 MILLIGRAM(S): at 10:49

## 2019-01-14 RX ADMIN — Medication 600 MILLIGRAM(S): at 05:05

## 2019-01-14 RX ADMIN — Medication 600 MILLIGRAM(S): at 04:35

## 2019-01-14 RX ADMIN — SIMETHICONE 80 MILLIGRAM(S): 80 TABLET, CHEWABLE ORAL at 09:35

## 2019-01-14 RX ADMIN — Medication 1 TABLET(S): at 12:30

## 2019-01-14 RX ADMIN — Medication 30 MILLIGRAM(S): at 08:25

## 2019-01-14 NOTE — PROGRESS NOTE ADULT - SUBJECTIVE AND OBJECTIVE BOX
PGY 1 Post Partum note    Subjective: Pt seen and evaluated at bedside. Doing well, pain controlled, no events overnight. Pt is voiding without difficulty, passing flatus and had BM, tolerating regular diet, ambulating, breast feeding. Denies CP, SOB, N/V, LE pain.    Physical exam:    Vital Signs Last 24 Hrs  T(F): 98 (13 Jan 2019 23:31), Max: 98 (13 Jan 2019 23:31)  HR: 78 (13 Jan 2019 23:31) (78 - 84)  BP: 112/64 (13 Jan 2019 23:31) (112/64 - 135/75)  RR: 18 (13 Jan 2019 23:31) (18 - 19)    Gen: NAD  CVS: s1s2, rrr  Lungs: ctab, no r/r/w  Abdomen: Soft, appropriately tender, no distension , firm uterine fundus below umbilicus.  Pelvic: Normal lochia rubra noted  Ext: No calf tenderness    Diet: regular  meds:   ibuprofen  Tablet.   600 milliGRAM(s) Oral (01-14-19 @ 04:35)   600 milliGRAM(s) Oral (01-13-19 @ 21:57)   600 milliGRAM(s) Oral (01-13-19 @ 16:20)   600 milliGRAM(s) Oral (01-13-19 @ 08:38)    prenatal multivitamin   1 Tablet(s) Oral (01-13-19 @ 12:05)    simethicone   80 milliGRAM(s) Chew (01-13-19 @ 21:57)   80 milliGRAM(s) Chew (01-13-19 @ 08:38)        LABS:                        11.7   15.97 )-----------( 236      ( 12 Jan 2019 22:00 )             34.8

## 2019-01-14 NOTE — DISCHARGE NOTE OB - MEDICATION SUMMARY - MEDICATIONS TO STOP TAKING
I will STOP taking the medications listed below when I get home from the hospital:  None I will STOP taking the medications listed below when I get home from the hospital:    Percocet 5/325 325 mg-5 mg oral tablet  -- 1 tab(s) by mouth every 6 hours - for severe pain  -- Caution federal law prohibits the transfer of this drug to any person other  than the person for whom it was prescribed.  May cause drowsiness.  Alcohol may intensify this effect.  Use care when operating dangerous machinery.  This prescription cannot be refilled.  This product contains acetaminophen.  Do not use  with any other product containing acetaminophen to prevent possible liver damage.  Using more of this medication than prescribed may cause serious breathing problems.

## 2019-01-14 NOTE — PROGRESS NOTE ADULT - ASSESSMENT
27 yo now , s/p , PPD#2, doing well.    - pain management PRN  - c/w regular diet, PO hydration  - encourage ambulation  - monitor vitals, bleeding  - d/c today    Dr. Rodriguez aware and service attending to be made aware

## 2019-01-14 NOTE — DISCHARGE NOTE OB - CARE PROVIDER_API CALL
Alicia Hinson), OBSGYN  Physicians  [address] [place] [address]  Phone: [phone]  Fax: [phone]

## 2019-01-14 NOTE — DISCHARGE NOTE OB - PATIENT PORTAL LINK FT
You can access the [facility] Patient Portal, offered by [facility], by registering with the following website: [email or web address]

## 2019-01-16 DIAGNOSIS — Z71.89 OTHER SPECIFIED COUNSELING: ICD-10-CM

## 2019-01-16 DIAGNOSIS — Z3A.40 40 WEEKS GESTATION OF PREGNANCY: ICD-10-CM

## 2019-01-16 DIAGNOSIS — O48.0 POST-TERM PREGNANCY: ICD-10-CM

## 2019-01-28 LAB
BILIRUB UR QL STRIP: NEGATIVE
BILIRUB UR QL STRIP: NORMAL
CLARITY UR: CLEAR
CLARITY UR: CLEAR
COLLECTION METHOD: NORMAL
COLLECTION METHOD: NORMAL
GLUCOSE UR-MCNC: NEGATIVE
GLUCOSE UR-MCNC: NORMAL
HCG UR QL: 0.2 EU/DL
HCG UR QL: NEGATIVE EU/DL
HGB UR QL STRIP.AUTO: NEGATIVE
HGB UR QL STRIP.AUTO: NORMAL
KETONES UR-MCNC: NEGATIVE
KETONES UR-MCNC: NORMAL
LEUKOCYTE ESTERASE UR QL STRIP: NORMAL
LEUKOCYTE ESTERASE UR QL STRIP: NORMAL
NITRITE UR QL STRIP: NEGATIVE
NITRITE UR QL STRIP: NORMAL
PH UR STRIP: 6
PH UR STRIP: 6
PROT UR STRIP-MCNC: NEGATIVE
PROT UR STRIP-MCNC: NORMAL
SP GR UR STRIP: 1.02
SP GR UR STRIP: 1.03

## 2019-02-28 ENCOUNTER — APPOINTMENT (OUTPATIENT)
Dept: OBGYN | Facility: CLINIC | Age: 29
End: 2019-02-28

## 2019-02-28 ENCOUNTER — OUTPATIENT (OUTPATIENT)
Dept: OUTPATIENT SERVICES | Facility: HOSPITAL | Age: 29
LOS: 1 days | Discharge: HOME | End: 2019-02-28

## 2019-02-28 VITALS — SYSTOLIC BLOOD PRESSURE: 110 MMHG | DIASTOLIC BLOOD PRESSURE: 70 MMHG | WEIGHT: 165 LBS | BODY MASS INDEX: 29.23 KG/M2

## 2019-02-28 DIAGNOSIS — Z98.82 BREAST IMPLANT STATUS: Chronic | ICD-10-CM

## 2019-02-28 DIAGNOSIS — Z98.890 OTHER SPECIFIED POSTPROCEDURAL STATES: Chronic | ICD-10-CM

## 2019-02-28 NOTE — HISTORY OF PRESENT ILLNESS
[Postpartum Follow Up] : postpartum follow up [Complications:___] : no complications [Delivery Date: ___] : on [unfilled] [] : delivered by vaginal delivery [Female] : Delivery History: baby girl [Wt. ___] : weighing [unfilled] [Rhogam] : Rhogam was not administered [Rubella Vaccine] : Rubella vaccine administered [Pertussis Vaccine] : Pertussis vaccine administered [Breastfeeding] : currently nursing [Discharge HCT: ___] : hematocrit level was [unfilled] [Discharge HGB: ___] : hemoglobin level was [unfilled] [Resumed Menses] : has resumed her menses [Resumed [place]] : has not resumed intercourse [Intended Contraception] : Intended Contraception: [IUD] : intrauterine device [Abdominal Pain] : no abdominal pain [Back to Normal] : is back to normal in size [None] : no vaginal bleeding [Normal] : the vagina was normal [Healing Well] : is not healing well [Cervix Sample Taken] : cervical sample not taken for a Pap smear [Examination Of The Breasts] : breasts are normal [Doing Well] : is doing well [FreeTextEntry8] : postpartum visit [FreeTextEntry9] : 1st baby, uncomplicated prenatal care [de-identified] : second degree laceration [de-identified] : postpartum visit, doing well. no issues, exclusively breast feeding. undecisive about type of birth control, reviewed all options, wants mirena but want to come back because not sure. condoms provided [de-identified] : rtc for birthcontrol. vaginitis panel sent

## 2019-03-06 LAB
A VAGINAE DNA VAG QL NAA+PROBE: NORMAL
BVAB2 DNA VAG QL NAA+PROBE: NORMAL
C KRUSEI DNA VAG QL NAA+PROBE: NEGATIVE
C TRACH RRNA SPEC QL NAA+PROBE: NEGATIVE
MEGA1 DNA VAG QL NAA+PROBE: NORMAL
N GONORRHOEA RRNA SPEC QL NAA+PROBE: NEGATIVE
T VAGINALIS RRNA SPEC QL NAA+PROBE: NEGATIVE

## 2019-03-28 ENCOUNTER — APPOINTMENT (OUTPATIENT)
Dept: OBGYN | Facility: CLINIC | Age: 29
End: 2019-03-28

## 2019-10-16 NOTE — OB RN PATIENT PROFILE - PARENTS VERBALIZED UNDERSTANDING OF THE SAFE SKIN TO SKIN POSITIONING OF THE NEWBORN.
Consent: The patient's consent was obtained including but not limited to risks of crusting, scabbing, blistering, scarring, darker or lighter pigmentary change, recurrence, incomplete removal and infection. Post-Care Instructions: I reviewed with the patient in detail post-care instructions. Patient is to wear sunprotection, and avoid picking at any of the treated lesions. Pt may apply Vaseline to crusted or scabbing areas. Render Post-Care Instructions In Note?: yes Detail Level: Simple Render Note In Bullet Format When Appropriate: No Duration Of Freeze Thaw-Cycle (Seconds): 3 Statement Selected

## 2019-12-24 ENCOUNTER — EMERGENCY (EMERGENCY)
Facility: HOSPITAL | Age: 29
LOS: 0 days | Discharge: LEFT BEFORE DISCHARGE | End: 2019-12-24
Attending: EMERGENCY MEDICINE | Admitting: EMERGENCY MEDICINE
Payer: MEDICAID

## 2019-12-24 VITALS
HEART RATE: 117 BPM | RESPIRATION RATE: 19 BRPM | WEIGHT: 160.06 LBS | DIASTOLIC BLOOD PRESSURE: 89 MMHG | OXYGEN SATURATION: 100 % | TEMPERATURE: 98 F | SYSTOLIC BLOOD PRESSURE: 131 MMHG

## 2019-12-24 DIAGNOSIS — O99.350 DISEASES OF THE NERVOUS SYSTEM COMPLICATING PREGNANCY, UNSPECIFIED TRIMESTER: ICD-10-CM

## 2019-12-24 DIAGNOSIS — Z98.890 OTHER SPECIFIED POSTPROCEDURAL STATES: Chronic | ICD-10-CM

## 2019-12-24 DIAGNOSIS — G51.0 BELL'S PALSY: ICD-10-CM

## 2019-12-24 DIAGNOSIS — Z98.82 BREAST IMPLANT STATUS: Chronic | ICD-10-CM

## 2019-12-24 PROCEDURE — 93010 ELECTROCARDIOGRAM REPORT: CPT

## 2019-12-24 PROCEDURE — 99284 EMERGENCY DEPT VISIT MOD MDM: CPT

## 2019-12-24 RX ORDER — ACYCLOVIR SODIUM 500 MG
1 VIAL (EA) INTRAVENOUS
Qty: 50 | Refills: 0
Start: 2019-12-24 | End: 2020-01-02

## 2019-12-24 RX ORDER — IPRATROPIUM/ALBUTEROL SULFATE 18-103MCG
3 AEROSOL WITH ADAPTER (GRAM) INHALATION ONCE
Refills: 0 | Status: DISCONTINUED | OUTPATIENT
Start: 2019-12-24 | End: 2019-12-24

## 2019-12-24 NOTE — ED PROVIDER NOTE - NEURO NEGATIVE STATEMENT, MLM
no loss of consciousness, no gait abnormality, +headache, +L sided facial paresthesia, and no weakness.

## 2019-12-24 NOTE — ED PROVIDER NOTE - ATTENDING CONTRIBUTION TO CARE
agree with above hpi. in addition, no fever, vaginal bleeding,. abd pain. on exam pt no distress, s1s2 ctab soft nt/nd, normal gait, left facial droop, left eye cant be kept closed, partially able to raise left eye brow. decrease sensation to left side of face.

## 2019-12-24 NOTE — ED PROVIDER NOTE - CLINICAL SUMMARY MEDICAL DECISION MAKING FREE TEXT BOX
pt with suspected bells palsey, pt also found to be incidentally pregnant. case discussed with neuro and who recommended labs and placement in obs. pt refused admission and overnight stay for MRI. pt was informed would get test done tonight but stated she needed to be home for baby. pt eloped prior to gettting labs done. pt eventually spoken to and rx sent for prednisone and acyclovir. pt aware to call OB and also to f/u with neuro

## 2019-12-24 NOTE — ED PROVIDER NOTE - ENMT, MLM
Airway patent, Nasal mucosa clear. Mouth with normal mucosa. Throat has no vesicles, no oropharyngeal exudates and uvula is midline. Mild asymmetry of L lip

## 2019-12-24 NOTE — ED ADULT TRIAGE NOTE - CHIEF COMPLAINT QUOTE
left sided facial numbness since yesterday at 0930pm, had headache x3 days, denies recent cold, GCS of 15, no other neuro deficit noted at this time

## 2019-12-24 NOTE — ED PROVIDER NOTE - PROGRESS NOTE DETAILS
Patient assessed. Noted to have ongoing L facial numbness and mild facial asymmetry. Will obtain CT Head non-contrast after Urine pregnancy POC. UPreg+, Will contact Neurology Contacted Dr. Mishra, recommended MRI Brain NC. Do not recommend contrast as she is pregnant. ACE and Lyme titers. Will start on Prednisone 60 mg once daily. Will send to Observation, can send home if MRI is negative. Patient had eloped from the ED. Initial plan was to send patient on Prednisone 60 mg once daily and PO Acyclovir x 10 days for treatment of suspected Bell's palsy. But patient was concerned that she is in her first trimester of pregnancy and of drug interactions with the fetus. She had eloped. She did not  her phone despite having attempted 5 times to reach her. These medications were initially sent to the pharmacy but due to inability to relay specific instructions to her (given her pregnancy status), they were cancelled. after pt eloped she called ed and was transferred to me. I resent meds to pharmacy and pt agrees to call OB in am to discuss risk benefits of meds prior to starting. pt aware she will need to f/u with neurologist.
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
## 2019-12-24 NOTE — ED ADULT NURSE REASSESSMENT NOTE - NS ED NURSE REASSESS COMMENT FT1
attempted to contact patient with phone numbers provided in chart with no success. Pt did not have PIV access. Labs were not obtained. Dr. Solomon made aware. Pt did not announce leaving. Previous conversation with patient was that RN would be gathering the supplies to butterfly her for labs.

## 2019-12-26 PROBLEM — G43.909 MIGRAINE, UNSPECIFIED, NOT INTRACTABLE, WITHOUT STATUS MIGRAINOSUS: Chronic | Status: ACTIVE | Noted: 2019-12-24

## 2019-12-27 ENCOUNTER — APPOINTMENT (OUTPATIENT)
Dept: NEUROLOGY | Facility: CLINIC | Age: 29
End: 2019-12-27
Payer: MEDICAID

## 2019-12-27 VITALS
OXYGEN SATURATION: 98 % | DIASTOLIC BLOOD PRESSURE: 84 MMHG | BODY MASS INDEX: 26.93 KG/M2 | WEIGHT: 152 LBS | TEMPERATURE: 98 F | SYSTOLIC BLOOD PRESSURE: 124 MMHG | HEART RATE: 98 BPM | HEIGHT: 63 IN

## 2019-12-27 PROCEDURE — 99214 OFFICE O/P EST MOD 30 MIN: CPT

## 2020-01-02 ENCOUNTER — RESULT CHARGE (OUTPATIENT)
Age: 30
End: 2020-01-02

## 2020-01-02 ENCOUNTER — OUTPATIENT (OUTPATIENT)
Dept: OUTPATIENT SERVICES | Facility: HOSPITAL | Age: 30
LOS: 1 days | Discharge: HOME | End: 2020-01-02

## 2020-01-02 ENCOUNTER — APPOINTMENT (OUTPATIENT)
Dept: OBGYN | Facility: CLINIC | Age: 30
End: 2020-01-02
Payer: MEDICAID

## 2020-01-02 VITALS
SYSTOLIC BLOOD PRESSURE: 100 MMHG | WEIGHT: 155 LBS | BODY MASS INDEX: 27.46 KG/M2 | DIASTOLIC BLOOD PRESSURE: 68 MMHG | HEIGHT: 63 IN

## 2020-01-02 DIAGNOSIS — Z98.890 OTHER SPECIFIED POSTPROCEDURAL STATES: Chronic | ICD-10-CM

## 2020-01-02 DIAGNOSIS — Z78.9 OTHER SPECIFIED HEALTH STATUS: ICD-10-CM

## 2020-01-02 DIAGNOSIS — Z98.82 BREAST IMPLANT STATUS: Chronic | ICD-10-CM

## 2020-01-02 LAB — HCG UR QL: POSITIVE

## 2020-01-02 PROCEDURE — 99214 OFFICE O/P EST MOD 30 MIN: CPT

## 2020-01-02 NOTE — ASSESSMENT
[FreeTextEntry1] : Patient is a 29 year old woman who initially presented to [facility] on 12/24/19 for left facial numbness and left side facial asymmetry.  Patient reports she has slightly improved with steroids.  \par \par Plan:\par \par 1. MRI Brain/Head without contrast  \par 2. Continue Prednisone and Acyclovir. Complete 7 day treatment.  Follow up with OB.  \par 3. Return in one month

## 2020-01-02 NOTE — PHYSICAL EXAM
[FreeTextEntry1] : Neurologic Examination:\par NAD. AOx3.  Intact memory.  Speech fluent, nondysarthric.  left side facial asymmetry and numbness, unable to close eye completely.  \par Strength 5/5 b/l UE/LE.  NL tone, bulk. No abnl movements.  DTRs 2+ throughout.  Plantar response downgoing b/l.  (-) Hoffmans, clonus.  Sensory intact LT/PP, pain, temp, proprioception and vibration.  NL FTN/HKS.  No dysdiadokinesia.  Gait narrow based/NL tandem.  \par

## 2020-01-02 NOTE — HISTORY OF PRESENT ILLNESS
[FreeTextEntry1] : Patient is a 29 year old woman who initially presented to [facility] on 12/24/19 for left facial numbness.  Patient reports symptoms began with a posterior headache for three days and severe pinching sensation of her left posterior neck and left eye twitching for two days.  She woke up on 12/24/19 with left eye heaviness and realized could not completely close her mouth when brushing her teeth that morning.  She denies paresthesias elsewhere and weakness.  Patient is 4 weeks pregnant and has a one year old and could not stay for MRI without contrast.  She went home.  Patient's PCP prescribed Prednisone and acyclovir for 7 days.  She has cleared the medications with her OB and patient will also follow up with her in 7 days.

## 2020-01-03 DIAGNOSIS — G51.0 BELL'S PALSY: ICD-10-CM

## 2020-01-03 DIAGNOSIS — O36.80X0 PREGNANCY WITH INCONCLUSIVE FETAL VIABILITY, NOT APPLICABLE OR UNSPECIFIED: ICD-10-CM

## 2020-01-03 LAB
HCG SERPL-MCNC: ABNORMAL MIU/ML
PROGEST SERPL-MCNC: 20.8 NG/ML

## 2020-01-07 ENCOUNTER — APPOINTMENT (OUTPATIENT)
Dept: ANTEPARTUM | Facility: CLINIC | Age: 30
End: 2020-01-07

## 2020-01-10 ENCOUNTER — APPOINTMENT (OUTPATIENT)
Dept: OBGYN | Facility: CLINIC | Age: 30
End: 2020-01-10
Payer: MEDICAID

## 2020-01-10 ENCOUNTER — OUTPATIENT (OUTPATIENT)
Dept: OUTPATIENT SERVICES | Facility: HOSPITAL | Age: 30
LOS: 1 days | Discharge: HOME | End: 2020-01-10

## 2020-01-10 VITALS — BODY MASS INDEX: 27.63 KG/M2 | DIASTOLIC BLOOD PRESSURE: 70 MMHG | WEIGHT: 156 LBS | SYSTOLIC BLOOD PRESSURE: 100 MMHG

## 2020-01-10 DIAGNOSIS — Z98.890 OTHER SPECIFIED POSTPROCEDURAL STATES: Chronic | ICD-10-CM

## 2020-01-10 DIAGNOSIS — Z98.82 BREAST IMPLANT STATUS: Chronic | ICD-10-CM

## 2020-01-10 PROCEDURE — 0500F INITIAL PRENATAL CARE VISIT: CPT | Mod: 25

## 2020-01-10 PROCEDURE — 76815 OB US LIMITED FETUS(S): CPT | Mod: 26

## 2020-01-14 DIAGNOSIS — Z34.90 ENCOUNTER FOR SUPERVISION OF NORMAL PREGNANCY, UNSPECIFIED, UNSPECIFIED TRIMESTER: ICD-10-CM

## 2020-01-14 LAB
ABO + RH PNL BLD: NORMAL
BACTERIA UR CULT: NORMAL
BASOPHILS # BLD AUTO: 0.02 K/UL
BASOPHILS NFR BLD AUTO: 0.2 %
BLD GP AB SCN SERPL QL: NORMAL
EOSINOPHIL # BLD AUTO: 0.04 K/UL
EOSINOPHIL NFR BLD AUTO: 0.4 %
HBV SURFACE AG SERPL QL IA: NONREACTIVE
HCG SERPL-MCNC: ABNORMAL MIU/ML
HCT VFR BLD CALC: 41.9 %
HGB BLD-MCNC: 13.7 G/DL
HIV1+2 AB SPEC QL IA.RAPID: NONREACTIVE
IMM GRANULOCYTES NFR BLD AUTO: 0.3 %
LEAD BLD-MCNC: <1 UG/DL
LYMPHOCYTES # BLD AUTO: 1.81 K/UL
LYMPHOCYTES NFR BLD AUTO: 19 %
MAN DIFF?: NORMAL
MCHC RBC-ENTMCNC: 27.8 PG
MCHC RBC-ENTMCNC: 32.7 G/DL
MCV RBC AUTO: 85.2 FL
MEV IGG FLD QL IA: 13.1 AU/ML
MEV IGG+IGM SER-IMP: NEGATIVE
MONOCYTES # BLD AUTO: 0.51 K/UL
MONOCYTES NFR BLD AUTO: 5.4 %
NEUTROPHILS # BLD AUTO: 7.12 K/UL
NEUTROPHILS NFR BLD AUTO: 74.7 %
PLATELET # BLD AUTO: 300 K/UL
RBC # BLD: 4.92 M/UL
RBC # FLD: 13 %
RUBV IGG FLD-ACNC: 1.7 INDEX
RUBV IGG SER-IMP: POSITIVE
T PALLIDUM AB SER QL IA: NEGATIVE
VZV AB TITR SER: POSITIVE
VZV IGG SER IF-ACNC: 371.7 INDEX
WBC # FLD AUTO: 9.53 K/UL

## 2020-01-15 ENCOUNTER — RESULT CHARGE (OUTPATIENT)
Age: 30
End: 2020-01-15

## 2020-01-15 ENCOUNTER — ASOB RESULT (OUTPATIENT)
Age: 30
End: 2020-01-15

## 2020-01-15 ENCOUNTER — OUTPATIENT (OUTPATIENT)
Dept: OUTPATIENT SERVICES | Facility: HOSPITAL | Age: 30
LOS: 1 days | Discharge: HOME | End: 2020-01-15

## 2020-01-15 ENCOUNTER — APPOINTMENT (OUTPATIENT)
Dept: ANTEPARTUM | Facility: CLINIC | Age: 30
End: 2020-01-15
Payer: MEDICAID

## 2020-01-15 VITALS
HEART RATE: 83 BPM | OXYGEN SATURATION: 99 % | BODY MASS INDEX: 27.81 KG/M2 | DIASTOLIC BLOOD PRESSURE: 63 MMHG | SYSTOLIC BLOOD PRESSURE: 128 MMHG | TEMPERATURE: 97.5 F | WEIGHT: 157 LBS

## 2020-01-15 DIAGNOSIS — Z98.82 BREAST IMPLANT STATUS: Chronic | ICD-10-CM

## 2020-01-15 DIAGNOSIS — Z98.890 OTHER SPECIFIED POSTPROCEDURAL STATES: Chronic | ICD-10-CM

## 2020-01-15 LAB
BILIRUB UR QL STRIP: NEGATIVE
CLARITY UR: CLEAR
COLLECTION METHOD: NORMAL
FETAL HEART RATE (BPM): 141
GLUCOSE UR-MCNC: NEGATIVE
HCG UR QL: 0.2 EU/DL
HGB UR QL STRIP.AUTO: NEGATIVE
KETONES UR-MCNC: NEGATIVE
LEUKOCYTE ESTERASE UR QL STRIP: NEGATIVE
NITRITE UR QL STRIP: NEGATIVE
OB COMMENTS: NORMAL
PH UR STRIP: 6.5
PROT UR STRIP-MCNC: NEGATIVE
SCHEDULED VISIT: YES
SP GR UR STRIP: 1.01
URINE ALBUMIN/PROTEIN: NEGATIVE
URINE GLUCOSE: NEGATIVE
URINE KETONES: NEGATIVE
WEEKS GESTATION: 7.2

## 2020-01-15 PROCEDURE — 99214 OFFICE O/P EST MOD 30 MIN: CPT | Mod: 25

## 2020-01-15 PROCEDURE — 76801 OB US < 14 WKS SINGLE FETUS: CPT | Mod: 26

## 2020-01-15 NOTE — OB HISTORY
[Pregnancy History] : girl [___] : at [unfilled] weeks GA [LMP: ___] : LMP: [unfilled] [JOSE: ___] : JOSE: [unfilled] [Spontaneous] : Spontaneous conception [Sonogram] : sonogram [at ___ wks] : at [unfilled] weeks

## 2020-01-15 NOTE — DISCUSSION/SUMMARY
[FreeTextEntry1] : 28yo  at 7w1d GA by LMP c/w 1st trim sono today, consult from Dr. Amaya for Bell's palsy. Today, denies abdominal cramping/VB. \par \par # Bell's palsy \par - patient presented to ED on  with left facial paralysis, headache and numbness, MRI was ordered in view of pregnancy, however eloped prior to full evaluation. Followed up with neurology as outpatient, diagnosed with Bell's palsy and prescribed prednisone, acyclovir x 1 week and recommended MRI without contrast. \par - Symptoms have improved. Only reports mild decreased sensation over left eyelid, no visible facial droop/asymmetry. \par - Obstetric and  complications are not generally increased among patients with Bell's palsy, except there appears to be an association with preeclampsia. Long-term outcome appears slightly worse in women who develop the disease during pregnancy. In a recent study; more pregnant women developed complete paralysis compared to nonpregnant women and males however this may also be confounded by the fact that pregnant women are less likely to receive treatment. It is not clear whether the risk of recurrence is higher in pregnant versus nonpregnant women. \par - Treatment recommendations for Bell's palsy during pregnancy are the same as for nonpregnant patients. Steroid administration during embryogenesis may be associated with an increased risk of cleft palate, although data is conflicting. Acyclovir is Category B in pregnancy, and studies have not shown increased risk of birth defects. \par - MRI, especially without contrast is safe in pregnancy. \par - Patient also reassured that physical therapy is safe in pregnancy. She would like to try acupuncture, though may not be efficacious, it is not harmful to the fetus.  \par - recommend f/u with neurology as scheduled on . \par \par # pregnancy \par - prenatal labs reviewed, needs MMR PP for measles non-immune; fragile X pending; recc hemoglobin electrophoresis \par - dating/viability sono today; detailed anatomy scan at 18-22w. \par - prenatal care with Dr. Brett William, next appointment . \par \par Can follow up with low risk clinic. Reconsult as needed.

## 2020-01-15 NOTE — ACTIVE PROBLEMS
[Diabetes Mellitus] : no diabetes mellitus [Hypertension] : no hypertension [Heart Disease] : no heart disease [Autoimmune Disease] : no autoimmune disease [Renal Disease] : no kidney disease, no UTI [Psychiatric Disorders] : no psychiatric disorders [Depression] : no depression, no post partum depression [Hepatic Disorder] : no hepatitis, no liver disease [Thrombophlebitis] : no varicosities, no phlebitis [Thyroid Disorder] : no thyroid dysfunction [Trauma] : no trauma/violence [Blood Transfusion (___ Ml)] : no history of blood transfusion

## 2020-01-15 NOTE — SURGICAL HISTORY
[Last Pap: ___] : Last Pap: [unfilled] [Fibroids] : no fibroids [Abn Paps] : no abnormal pap smears [Breast Disease] : no breast disease [STI's] : no STI's [Infertility] : no infertility [Cysts] : no cysts

## 2020-01-15 NOTE — DATA REVIEWED
[FreeTextEntry1] : 9/13/18 \par pap NILM \par \par 9/26/18 \par CF neg; SMA neg \par \par 1/10/20 \par O pos, antibody screen neg\par CBC: 9.5>13.7/41.9<300\par HbsAg NR; RPR NR; HIV NR\par rubella immune; measles non-immune; varicella immune\par lead neg\par Ucx contaminated\par GC/Cl neg\par fragile X pending

## 2020-01-15 NOTE — PHYSICAL EXAM
[FreeTextEntry1] : Gen: NAD, AAO x3 \par HEENT: no visible facial droop/asymmetry\par heart: RRR, S1S2+\par Lungs: CTA B/L, no r/r/w \par Abd: soft, gravid, nontender\par Ext: 2+ DTRs UE B/L, no edema/tenderness

## 2020-01-17 DIAGNOSIS — Z3A.10 10 WEEKS GESTATION OF PREGNANCY: ICD-10-CM

## 2020-01-17 DIAGNOSIS — O36.80X0 PREGNANCY WITH INCONCLUSIVE FETAL VIABILITY, NOT APPLICABLE OR UNSPECIFIED: ICD-10-CM

## 2020-01-17 DIAGNOSIS — G51.0 BELL'S PALSY: ICD-10-CM

## 2020-01-17 LAB — FMR1 GENE MUT ANL BLD/T: NORMAL

## 2020-01-24 ENCOUNTER — APPOINTMENT (OUTPATIENT)
Dept: NEUROLOGY | Facility: CLINIC | Age: 30
End: 2020-01-24

## 2020-02-11 ENCOUNTER — LABORATORY RESULT (OUTPATIENT)
Age: 30
End: 2020-02-11

## 2020-02-12 ENCOUNTER — OUTPATIENT (OUTPATIENT)
Dept: OUTPATIENT SERVICES | Facility: HOSPITAL | Age: 30
LOS: 1 days | Discharge: HOME | End: 2020-02-12

## 2020-02-12 ENCOUNTER — ASOB RESULT (OUTPATIENT)
Age: 30
End: 2020-02-12

## 2020-02-12 ENCOUNTER — APPOINTMENT (OUTPATIENT)
Dept: ANTEPARTUM | Facility: CLINIC | Age: 30
End: 2020-02-12
Payer: MEDICAID

## 2020-02-12 DIAGNOSIS — Z98.82 BREAST IMPLANT STATUS: Chronic | ICD-10-CM

## 2020-02-12 DIAGNOSIS — Z98.890 OTHER SPECIFIED POSTPROCEDURAL STATES: Chronic | ICD-10-CM

## 2020-02-12 PROCEDURE — 76813 OB US NUCHAL MEAS 1 GEST: CPT | Mod: 26

## 2020-02-12 PROCEDURE — 99211 OFF/OP EST MAY X REQ PHY/QHP: CPT | Mod: 25

## 2020-02-18 DIAGNOSIS — Z36.3 ENCOUNTER FOR ANTENATAL SCREENING FOR MALFORMATIONS: ICD-10-CM

## 2020-02-18 DIAGNOSIS — Z3A.11 11 WEEKS GESTATION OF PREGNANCY: ICD-10-CM

## 2020-02-18 DIAGNOSIS — Z36.82 ENCOUNTER FOR ANTENATAL SCREENING FOR NUCHAL TRANSLUCENCY: ICD-10-CM

## 2020-02-18 DIAGNOSIS — Z36.89 ENCOUNTER FOR OTHER SPECIFIED ANTENATAL SCREENING: ICD-10-CM

## 2020-02-18 DIAGNOSIS — O35.9XX0 MATERNAL CARE FOR (SUSPECTED) FETAL ABNORMALITY AND DAMAGE, UNSPECIFIED, NOT APPLICABLE OR UNSPECIFIED: ICD-10-CM

## 2020-02-27 ENCOUNTER — OUTPATIENT (OUTPATIENT)
Dept: OUTPATIENT SERVICES | Facility: HOSPITAL | Age: 30
LOS: 1 days | Discharge: HOME | End: 2020-02-27

## 2020-02-27 ENCOUNTER — NON-APPOINTMENT (OUTPATIENT)
Age: 30
End: 2020-02-27

## 2020-02-27 ENCOUNTER — APPOINTMENT (OUTPATIENT)
Dept: OBGYN | Facility: CLINIC | Age: 30
End: 2020-02-27
Payer: MEDICAID

## 2020-02-27 ENCOUNTER — RESULT CHARGE (OUTPATIENT)
Age: 30
End: 2020-02-27

## 2020-02-27 VITALS
SYSTOLIC BLOOD PRESSURE: 102 MMHG | DIASTOLIC BLOOD PRESSURE: 60 MMHG | HEIGHT: 63 IN | BODY MASS INDEX: 26.93 KG/M2 | WEIGHT: 152 LBS

## 2020-02-27 DIAGNOSIS — R11.2 NAUSEA WITH VOMITING, UNSPECIFIED: ICD-10-CM

## 2020-02-27 DIAGNOSIS — Z98.890 OTHER SPECIFIED POSTPROCEDURAL STATES: Chronic | ICD-10-CM

## 2020-02-27 DIAGNOSIS — Z34.90 ENCOUNTER FOR SUPERVISION OF NORMAL PREGNANCY, UNSPECIFIED, UNSPECIFIED TRIMESTER: ICD-10-CM

## 2020-02-27 DIAGNOSIS — Z98.82 BREAST IMPLANT STATUS: Chronic | ICD-10-CM

## 2020-02-27 DIAGNOSIS — B37.2 CANDIDIASIS OF SKIN AND NAIL: ICD-10-CM

## 2020-02-27 PROCEDURE — 0500F INITIAL PRENATAL CARE VISIT: CPT

## 2020-03-06 LAB
BILIRUB UR QL STRIP: NORMAL
CLARITY UR: CLEAR
COLLECTION METHOD: NORMAL
GLUCOSE UR-MCNC: NORMAL
HCG UR QL: 0.2 EU/DL
HGB UR QL STRIP.AUTO: NORMAL
KETONES UR-MCNC: NORMAL
LEUKOCYTE ESTERASE UR QL STRIP: NORMAL
NITRITE UR QL STRIP: NORMAL
PH UR STRIP: 6
PROT UR STRIP-MCNC: NORMAL
SP GR UR STRIP: 1.02

## 2020-03-12 ENCOUNTER — LABORATORY RESULT (OUTPATIENT)
Age: 30
End: 2020-03-12

## 2020-03-20 LAB
C TRACH RRNA SPEC QL NAA+PROBE: NOT DETECTED
N GONORRHOEA RRNA SPEC QL NAA+PROBE: NOT DETECTED
SOURCE AMPLIFICATION: NORMAL

## 2020-03-27 ENCOUNTER — NON-APPOINTMENT (OUTPATIENT)
Age: 30
End: 2020-03-27

## 2020-03-27 ENCOUNTER — APPOINTMENT (OUTPATIENT)
Dept: OBGYN | Facility: CLINIC | Age: 30
End: 2020-03-27
Payer: MEDICAID

## 2020-03-27 ENCOUNTER — OUTPATIENT (OUTPATIENT)
Dept: OUTPATIENT SERVICES | Facility: HOSPITAL | Age: 30
LOS: 1 days | Discharge: HOME | End: 2020-03-27

## 2020-03-27 VITALS
DIASTOLIC BLOOD PRESSURE: 68 MMHG | BODY MASS INDEX: 27.46 KG/M2 | HEIGHT: 63 IN | WEIGHT: 155 LBS | SYSTOLIC BLOOD PRESSURE: 110 MMHG

## 2020-03-27 DIAGNOSIS — Z98.82 BREAST IMPLANT STATUS: Chronic | ICD-10-CM

## 2020-03-27 DIAGNOSIS — Z98.890 OTHER SPECIFIED POSTPROCEDURAL STATES: Chronic | ICD-10-CM

## 2020-03-27 PROCEDURE — 0502F SUBSEQUENT PRENATAL CARE: CPT

## 2020-04-08 ENCOUNTER — ASOB RESULT (OUTPATIENT)
Age: 30
End: 2020-04-08

## 2020-04-08 ENCOUNTER — OUTPATIENT (OUTPATIENT)
Dept: OUTPATIENT SERVICES | Facility: HOSPITAL | Age: 30
LOS: 1 days | Discharge: HOME | End: 2020-04-08

## 2020-04-08 ENCOUNTER — APPOINTMENT (OUTPATIENT)
Dept: ANTEPARTUM | Facility: CLINIC | Age: 30
End: 2020-04-08
Payer: MEDICAID

## 2020-04-08 DIAGNOSIS — Z98.82 BREAST IMPLANT STATUS: Chronic | ICD-10-CM

## 2020-04-08 DIAGNOSIS — Z98.890 OTHER SPECIFIED POSTPROCEDURAL STATES: Chronic | ICD-10-CM

## 2020-04-08 PROCEDURE — 76805 OB US >/= 14 WKS SNGL FETUS: CPT | Mod: 26

## 2020-04-09 DIAGNOSIS — Z3A.19 19 WEEKS GESTATION OF PREGNANCY: ICD-10-CM

## 2020-04-09 DIAGNOSIS — Z36.89 ENCOUNTER FOR OTHER SPECIFIED ANTENATAL SCREENING: ICD-10-CM

## 2020-04-09 DIAGNOSIS — Z36.3 ENCOUNTER FOR ANTENATAL SCREENING FOR MALFORMATIONS: ICD-10-CM

## 2020-04-09 DIAGNOSIS — O35.9XX0 MATERNAL CARE FOR (SUSPECTED) FETAL ABNORMALITY AND DAMAGE, UNSPECIFIED, NOT APPLICABLE OR UNSPECIFIED: ICD-10-CM

## 2020-04-30 ENCOUNTER — APPOINTMENT (OUTPATIENT)
Dept: OBGYN | Facility: CLINIC | Age: 30
End: 2020-04-30
Payer: MEDICAID

## 2020-04-30 ENCOUNTER — OUTPATIENT (OUTPATIENT)
Dept: OUTPATIENT SERVICES | Facility: HOSPITAL | Age: 30
LOS: 1 days | Discharge: HOME | End: 2020-04-30

## 2020-04-30 ENCOUNTER — NON-APPOINTMENT (OUTPATIENT)
Age: 30
End: 2020-04-30

## 2020-04-30 ENCOUNTER — RESULT CHARGE (OUTPATIENT)
Age: 30
End: 2020-04-30

## 2020-04-30 VITALS
DIASTOLIC BLOOD PRESSURE: 64 MMHG | SYSTOLIC BLOOD PRESSURE: 104 MMHG | HEIGHT: 63 IN | BODY MASS INDEX: 28.35 KG/M2 | WEIGHT: 160 LBS

## 2020-04-30 DIAGNOSIS — Z34.90 ENCOUNTER FOR SUPERVISION OF NORMAL PREGNANCY, UNSPECIFIED, UNSPECIFIED TRIMESTER: ICD-10-CM

## 2020-04-30 DIAGNOSIS — Z98.890 OTHER SPECIFIED POSTPROCEDURAL STATES: Chronic | ICD-10-CM

## 2020-04-30 DIAGNOSIS — Z87.898 PERSONAL HISTORY OF OTHER SPECIFIED CONDITIONS: ICD-10-CM

## 2020-04-30 DIAGNOSIS — Z86.69 PERSONAL HISTORY OF OTHER DISEASES OF THE NERVOUS SYSTEM AND SENSE ORGANS: ICD-10-CM

## 2020-04-30 DIAGNOSIS — O36.80X0 PREGNANCY WITH INCONCLUSIVE FETAL VIABILITY, NOT APPLICABLE OR UNSPECIFIED: ICD-10-CM

## 2020-04-30 DIAGNOSIS — Z98.82 BREAST IMPLANT STATUS: Chronic | ICD-10-CM

## 2020-04-30 PROCEDURE — 0502F SUBSEQUENT PRENATAL CARE: CPT

## 2020-05-26 LAB
BASOPHILS # BLD AUTO: 0.01 K/UL
BASOPHILS NFR BLD AUTO: 0.1 %
EOSINOPHIL # BLD AUTO: 0.03 K/UL
EOSINOPHIL NFR BLD AUTO: 0.4 %
GLUCOSE 1H P 50 G GLC PO SERPL-MCNC: 92 MG/DL
HCT VFR BLD CALC: 36.6 %
HGB BLD-MCNC: 12 G/DL
IMM GRANULOCYTES NFR BLD AUTO: 0.4 %
LYMPHOCYTES # BLD AUTO: 1.35 K/UL
LYMPHOCYTES NFR BLD AUTO: 19.1 %
MAN DIFF?: NORMAL
MCHC RBC-ENTMCNC: 28.2 PG
MCHC RBC-ENTMCNC: 32.8 G/DL
MCV RBC AUTO: 86.1 FL
MONOCYTES # BLD AUTO: 0.4 K/UL
MONOCYTES NFR BLD AUTO: 5.7 %
NEUTROPHILS # BLD AUTO: 5.23 K/UL
NEUTROPHILS NFR BLD AUTO: 74.3 %
PLATELET # BLD AUTO: 221 K/UL
RBC # BLD: 4.25 M/UL
RBC # FLD: 13.2 %
T PALLIDUM AB SER QL IA: NEGATIVE
WBC # FLD AUTO: 7.05 K/UL

## 2020-06-01 ENCOUNTER — OUTPATIENT (OUTPATIENT)
Dept: OUTPATIENT SERVICES | Facility: HOSPITAL | Age: 30
LOS: 1 days | End: 2020-06-01
Payer: MEDICAID

## 2020-06-01 DIAGNOSIS — Z98.82 BREAST IMPLANT STATUS: Chronic | ICD-10-CM

## 2020-06-01 DIAGNOSIS — Z98.890 OTHER SPECIFIED POSTPROCEDURAL STATES: Chronic | ICD-10-CM

## 2020-06-12 ENCOUNTER — RESULT CHARGE (OUTPATIENT)
Age: 30
End: 2020-06-12

## 2020-06-12 ENCOUNTER — OUTPATIENT (OUTPATIENT)
Dept: OUTPATIENT SERVICES | Facility: HOSPITAL | Age: 30
LOS: 1 days | Discharge: HOME | End: 2020-06-12

## 2020-06-12 ENCOUNTER — APPOINTMENT (OUTPATIENT)
Dept: OBGYN | Facility: CLINIC | Age: 30
End: 2020-06-12
Payer: MEDICAID

## 2020-06-12 ENCOUNTER — NON-APPOINTMENT (OUTPATIENT)
Age: 30
End: 2020-06-12

## 2020-06-12 VITALS
HEIGHT: 63 IN | WEIGHT: 164.38 LBS | DIASTOLIC BLOOD PRESSURE: 68 MMHG | SYSTOLIC BLOOD PRESSURE: 108 MMHG | BODY MASS INDEX: 29.12 KG/M2

## 2020-06-12 DIAGNOSIS — Z98.82 BREAST IMPLANT STATUS: Chronic | ICD-10-CM

## 2020-06-12 DIAGNOSIS — Z98.890 OTHER SPECIFIED POSTPROCEDURAL STATES: Chronic | ICD-10-CM

## 2020-06-12 PROCEDURE — 0502F SUBSEQUENT PRENATAL CARE: CPT

## 2020-06-15 PROCEDURE — G9001: CPT

## 2020-07-07 DIAGNOSIS — Z71.89 OTHER SPECIFIED COUNSELING: ICD-10-CM

## 2020-07-10 ENCOUNTER — NON-APPOINTMENT (OUTPATIENT)
Age: 30
End: 2020-07-10

## 2020-07-10 ENCOUNTER — APPOINTMENT (OUTPATIENT)
Dept: ANTEPARTUM | Facility: CLINIC | Age: 30
End: 2020-07-10
Payer: MEDICAID

## 2020-07-10 ENCOUNTER — APPOINTMENT (OUTPATIENT)
Dept: OBGYN | Facility: CLINIC | Age: 30
End: 2020-07-10
Payer: MEDICAID

## 2020-07-10 ENCOUNTER — OUTPATIENT (OUTPATIENT)
Dept: OUTPATIENT SERVICES | Facility: HOSPITAL | Age: 30
LOS: 1 days | Discharge: HOME | End: 2020-07-10

## 2020-07-10 ENCOUNTER — ASOB RESULT (OUTPATIENT)
Age: 30
End: 2020-07-10

## 2020-07-10 ENCOUNTER — RESULT CHARGE (OUTPATIENT)
Age: 30
End: 2020-07-10

## 2020-07-10 VITALS
WEIGHT: 184.25 LBS | DIASTOLIC BLOOD PRESSURE: 68 MMHG | BODY MASS INDEX: 32.64 KG/M2 | SYSTOLIC BLOOD PRESSURE: 108 MMHG | HEIGHT: 63 IN

## 2020-07-10 DIAGNOSIS — Z98.82 BREAST IMPLANT STATUS: Chronic | ICD-10-CM

## 2020-07-10 DIAGNOSIS — Z98.890 OTHER SPECIFIED POSTPROCEDURAL STATES: Chronic | ICD-10-CM

## 2020-07-10 DIAGNOSIS — B37.2 CANDIDIASIS OF SKIN AND NAIL: ICD-10-CM

## 2020-07-10 DIAGNOSIS — N94.9 UNSPECIFIED CONDITION ASSOCIATED WITH FEMALE GENITAL ORGANS AND MENSTRUAL CYCLE: ICD-10-CM

## 2020-07-10 LAB
BILIRUB UR QL STRIP: NORMAL
CLARITY UR: CLEAR
COLLECTION METHOD: NORMAL
GLUCOSE UR-MCNC: NORMAL
HCG UR QL: NORMAL EU/DL
HGB UR QL STRIP.AUTO: NORMAL
KETONES UR-MCNC: NORMAL
LEUKOCYTE ESTERASE UR QL STRIP: NORMAL
NITRITE UR QL STRIP: NORMAL
PH UR STRIP: 7
PROT UR STRIP-MCNC: NORMAL
SP GR UR STRIP: 1.01

## 2020-07-10 PROCEDURE — 0502F SUBSEQUENT PRENATAL CARE: CPT

## 2020-07-10 PROCEDURE — 76816 OB US FOLLOW-UP PER FETUS: CPT | Mod: 26

## 2020-07-13 ENCOUNTER — NON-APPOINTMENT (OUTPATIENT)
Age: 30
End: 2020-07-13

## 2020-07-13 DIAGNOSIS — O36.60X0 MATERNAL CARE FOR EXCESSIVE FETAL GROWTH, UNSPECIFIED TRIMESTER, NOT APPLICABLE OR UNSPECIFIED: ICD-10-CM

## 2020-07-13 DIAGNOSIS — Z36.89 ENCOUNTER FOR OTHER SPECIFIED ANTENATAL SCREENING: ICD-10-CM

## 2020-07-13 DIAGNOSIS — Z3A.32 32 WEEKS GESTATION OF PREGNANCY: ICD-10-CM

## 2020-07-15 DIAGNOSIS — Z34.90 ENCOUNTER FOR SUPERVISION OF NORMAL PREGNANCY, UNSPECIFIED, UNSPECIFIED TRIMESTER: ICD-10-CM

## 2020-07-28 LAB
BASOPHILS # BLD AUTO: 0.01 K/UL
BASOPHILS NFR BLD AUTO: 0.1 %
EOSINOPHIL # BLD AUTO: 0.03 K/UL
EOSINOPHIL NFR BLD AUTO: 0.4 %
HCT VFR BLD CALC: 38.6 %
HGB BLD-MCNC: 12.3 G/DL
HIV1+2 AB SPEC QL IA.RAPID: NONREACTIVE
IMM GRANULOCYTES NFR BLD AUTO: 0.6 %
LYMPHOCYTES # BLD AUTO: 1.75 K/UL
LYMPHOCYTES NFR BLD AUTO: 21.5 %
MAN DIFF?: NORMAL
MCHC RBC-ENTMCNC: 28 PG
MCHC RBC-ENTMCNC: 31.9 G/DL
MCV RBC AUTO: 87.7 FL
MONOCYTES # BLD AUTO: 0.42 K/UL
MONOCYTES NFR BLD AUTO: 5.2 %
NEUTROPHILS # BLD AUTO: 5.87 K/UL
NEUTROPHILS NFR BLD AUTO: 72.2 %
PLATELET # BLD AUTO: 222 K/UL
RBC # BLD: 4.4 M/UL
RBC # FLD: 13.4 %
WBC # FLD AUTO: 8.13 K/UL

## 2020-08-06 ENCOUNTER — APPOINTMENT (OUTPATIENT)
Dept: OBGYN | Facility: CLINIC | Age: 30
End: 2020-08-06
Payer: MEDICAID

## 2020-08-06 ENCOUNTER — RESULT CHARGE (OUTPATIENT)
Age: 30
End: 2020-08-06

## 2020-08-06 ENCOUNTER — NON-APPOINTMENT (OUTPATIENT)
Age: 30
End: 2020-08-06

## 2020-08-06 ENCOUNTER — OUTPATIENT (OUTPATIENT)
Dept: OUTPATIENT SERVICES | Facility: HOSPITAL | Age: 30
LOS: 1 days | Discharge: HOME | End: 2020-08-06

## 2020-08-06 VITALS — SYSTOLIC BLOOD PRESSURE: 100 MMHG | BODY MASS INDEX: 31 KG/M2 | DIASTOLIC BLOOD PRESSURE: 70 MMHG | WEIGHT: 175 LBS

## 2020-08-06 DIAGNOSIS — Z98.890 OTHER SPECIFIED POSTPROCEDURAL STATES: Chronic | ICD-10-CM

## 2020-08-06 DIAGNOSIS — Z98.82 BREAST IMPLANT STATUS: Chronic | ICD-10-CM

## 2020-08-06 LAB
BILIRUB UR QL STRIP: NORMAL
CLARITY UR: CLEAR
COLLECTION METHOD: NORMAL
GLUCOSE UR-MCNC: NORMAL
HCG UR QL: NORMAL EU/DL
HGB UR QL STRIP.AUTO: NORMAL
KETONES UR-MCNC: NORMAL
LEUKOCYTE ESTERASE UR QL STRIP: NORMAL
NITRITE UR QL STRIP: NORMAL
PH UR STRIP: 5
PROT UR STRIP-MCNC: NORMAL
SP GR UR STRIP: 1.01

## 2020-08-06 PROCEDURE — 0502F SUBSEQUENT PRENATAL CARE: CPT

## 2020-08-08 ENCOUNTER — LABORATORY RESULT (OUTPATIENT)
Age: 30
End: 2020-08-08

## 2020-08-14 ENCOUNTER — APPOINTMENT (OUTPATIENT)
Dept: ANTEPARTUM | Facility: CLINIC | Age: 30
End: 2020-08-14
Payer: MEDICAID

## 2020-08-14 ENCOUNTER — ASOB RESULT (OUTPATIENT)
Age: 30
End: 2020-08-14

## 2020-08-14 DIAGNOSIS — O09.299 SUPERVISION OF PREGNANCY WITH OTHER POOR REPRODUCTIVE OR OBSTETRIC HISTORY, UNSPECIFIED TRIMESTER: ICD-10-CM

## 2020-08-14 PROCEDURE — 76816 OB US FOLLOW-UP PER FETUS: CPT | Mod: 26

## 2020-08-14 PROCEDURE — 76818 FETAL BIOPHYS PROFILE W/NST: CPT | Mod: 26

## 2020-08-17 DIAGNOSIS — Z3A.37 37 WEEKS GESTATION OF PREGNANCY: ICD-10-CM

## 2020-08-17 DIAGNOSIS — O09.299 SUPERVISION OF PREGNANCY WITH OTHER POOR REPRODUCTIVE OR OBSTETRIC HISTORY, UNSPECIFIED TRIMESTER: ICD-10-CM

## 2020-08-17 DIAGNOSIS — O40.3XX0 POLYHYDRAMNIOS, THIRD TRIMESTER, NOT APPLICABLE OR UNSPECIFIED: ICD-10-CM

## 2020-08-17 DIAGNOSIS — Z34.90 ENCOUNTER FOR SUPERVISION OF NORMAL PREGNANCY, UNSPECIFIED, UNSPECIFIED TRIMESTER: ICD-10-CM

## 2020-08-17 DIAGNOSIS — O36.60X0 MATERNAL CARE FOR EXCESSIVE FETAL GROWTH, UNSPECIFIED TRIMESTER, NOT APPLICABLE OR UNSPECIFIED: ICD-10-CM

## 2020-08-18 ENCOUNTER — APPOINTMENT (OUTPATIENT)
Dept: OBGYN | Facility: CLINIC | Age: 30
End: 2020-08-18
Payer: MEDICAID

## 2020-08-18 ENCOUNTER — RESULT CHARGE (OUTPATIENT)
Age: 30
End: 2020-08-18

## 2020-08-18 ENCOUNTER — NON-APPOINTMENT (OUTPATIENT)
Age: 30
End: 2020-08-18

## 2020-08-18 ENCOUNTER — OUTPATIENT (OUTPATIENT)
Dept: OUTPATIENT SERVICES | Facility: HOSPITAL | Age: 30
LOS: 1 days | Discharge: HOME | End: 2020-08-18
Payer: MEDICAID

## 2020-08-18 ENCOUNTER — OUTPATIENT (OUTPATIENT)
Dept: OUTPATIENT SERVICES | Facility: HOSPITAL | Age: 30
LOS: 1 days | Discharge: HOME | End: 2020-08-18

## 2020-08-18 VITALS
HEIGHT: 63 IN | DIASTOLIC BLOOD PRESSURE: 64 MMHG | BODY MASS INDEX: 31.71 KG/M2 | SYSTOLIC BLOOD PRESSURE: 112 MMHG | WEIGHT: 179 LBS

## 2020-08-18 VITALS — DIASTOLIC BLOOD PRESSURE: 72 MMHG | HEART RATE: 97 BPM | SYSTOLIC BLOOD PRESSURE: 114 MMHG

## 2020-08-18 VITALS — SYSTOLIC BLOOD PRESSURE: 124 MMHG | DIASTOLIC BLOOD PRESSURE: 74 MMHG | HEART RATE: 89 BPM

## 2020-08-18 DIAGNOSIS — Z98.82 BREAST IMPLANT STATUS: Chronic | ICD-10-CM

## 2020-08-18 DIAGNOSIS — Z98.890 OTHER SPECIFIED POSTPROCEDURAL STATES: Chronic | ICD-10-CM

## 2020-08-18 PROCEDURE — 99282 EMERGENCY DEPT VISIT SF MDM: CPT | Mod: 25

## 2020-08-18 PROCEDURE — 76815 OB US LIMITED FETUS(S): CPT | Mod: 26

## 2020-08-18 PROCEDURE — 0502F SUBSEQUENT PRENATAL CARE: CPT

## 2020-08-18 PROCEDURE — 59025 FETAL NON-STRESS TEST: CPT | Mod: 26

## 2020-08-18 NOTE — OB PROVIDER TRIAGE NOTE - NSHPPHYSICALEXAM_GEN_ALL_CORE
T(C): 36.9 (08-18-20 @ 12:42), Max: 36.9 (08-18-20 @ 12:42)  HR: 94 (08-18-20 @ 12:56) (89 - 94)  BP: 118/69 (08-18-20 @ 12:56) (118/69 - 124/74)  RR: 18 (08-18-20 @ 12:42) (18 - 18)    EFM: 130 bpm, moderate variability, +accels  TOCO: irregular

## 2020-08-18 NOTE — OB PROVIDER TRIAGE NOTE - NSHPLABSRESULTS_GEN_ALL_CORE
Blood type:  GBS:  HBsAg:  Rubella:  RPR:  Gonorrhea:  Chlamydia:  Varicella:  HIV:  CF:  SMA:  Pap smear:     GCT:    GTT:    ANEUPLOIDY SCREENING:    SONOGRAMS: Blood type: O+  GBS: neg  HBsAg: neg  Rubella: immune  RPR: neg  Gonorrhea: neg  Chlamydia: neg  Varicella: positive  HIV: neg    GCT: 92    SONOGRAMS:  2/14/2020: ant placenta, NT 1.11 mm  4/8/2020: 19.1 weeks: breech, ant placenta,  no major malformations  7/10/2020: 32.3 weeks: EFW 2589 gms (98%), vtx, ant placenta, HOLDEN 5.28 cm, BPP 8/8  8/14/2020: 37.3 weeks: EFW 3196 gms (57%), HOLDEN 8.24 cm, BPP 8/8

## 2020-08-18 NOTE — OB PROVIDER TRIAGE NOTE - ADDITIONAL INSTRUCTIONS
- discharge home  - PO hydration recommended  - labor precautions given  - kick count instructions discussed  - follow up with PMD as scheduled

## 2020-08-18 NOTE — OB PROVIDER TRIAGE NOTE - HISTORY OF PRESENT ILLNESS
30y  @ 38 weeks by first trimester sonogram, EDC 2020, present for decreased fetal movement. Patient states she noticed decreased fetal movement last night. Pateint states she ate cookies and still did not feel movement until 0700. She went for scheduled OB visit today and was told to come to L&D for evaluation Experinecing intermittent contractions for about 1 week. GBS negative. Denies VB and LOF. Currently +FM. No complications with this pregnancy.

## 2020-08-18 NOTE — OB PROVIDER TRIAGE NOTE - NS_OBGYNHISTORY_OBGYN_ALL_OB_FT
OB Hx:  x 1. Largest 6-12    G1 19 FT NSVS F 6-12 [facility] No complications Received Epi    Gyn Hx:  Denies cysts, fibroids, abnormal paps, and STIs.

## 2020-08-18 NOTE — OB PROVIDER TRIAGE NOTE - NSOBPROVIDERNOTE_OBGYN_ALL_OB_FT
30y  @ 38 weeks, GBS negative, not in labor, BPP .    -Discharged Home  -Labor precautions reviewed  -PO Hydration encouraged  -Advised to count fetal kick counts  -Follow up with your scheduled OB visit 30y  @ 38 weeks, GBS negative, not in labor, BPP .    -Discharged Home  -Labor precautions reviewed  -PO Hydration encouraged  -Advised to count fetal kick counts  -Follow up with your scheduled OB visit    Attending note: 31 yo  at 38 weeks with reassuring fetal testing not in labor. Labor precautions given. Pt to follow up with scheduled ob visit

## 2020-08-21 ENCOUNTER — APPOINTMENT (OUTPATIENT)
Dept: ANTEPARTUM | Facility: CLINIC | Age: 30
End: 2020-08-21
Payer: MEDICAID

## 2020-08-21 ENCOUNTER — ASOB RESULT (OUTPATIENT)
Age: 30
End: 2020-08-21

## 2020-08-21 PROCEDURE — 76819 FETAL BIOPHYS PROFIL W/O NST: CPT | Mod: 26

## 2020-08-23 ENCOUNTER — OUTPATIENT (OUTPATIENT)
Dept: OUTPATIENT SERVICES | Facility: HOSPITAL | Age: 30
LOS: 1 days | Discharge: HOME | End: 2020-08-23

## 2020-08-23 ENCOUNTER — LABORATORY RESULT (OUTPATIENT)
Age: 30
End: 2020-08-23

## 2020-08-23 DIAGNOSIS — Z98.82 BREAST IMPLANT STATUS: Chronic | ICD-10-CM

## 2020-08-23 DIAGNOSIS — Z11.59 ENCOUNTER FOR SCREENING FOR OTHER VIRAL DISEASES: ICD-10-CM

## 2020-08-23 DIAGNOSIS — Z98.890 OTHER SPECIFIED POSTPROCEDURAL STATES: Chronic | ICD-10-CM

## 2020-08-25 ENCOUNTER — INPATIENT (INPATIENT)
Facility: HOSPITAL | Age: 30
LOS: 2 days | Discharge: HOME | End: 2020-08-28
Attending: OBSTETRICS & GYNECOLOGY | Admitting: OBSTETRICS & GYNECOLOGY
Payer: MEDICAID

## 2020-08-25 VITALS — HEART RATE: 101 BPM | SYSTOLIC BLOOD PRESSURE: 123 MMHG | DIASTOLIC BLOOD PRESSURE: 80 MMHG

## 2020-08-25 DIAGNOSIS — Z98.82 BREAST IMPLANT STATUS: Chronic | ICD-10-CM

## 2020-08-25 DIAGNOSIS — Z98.890 OTHER SPECIFIED POSTPROCEDURAL STATES: Chronic | ICD-10-CM

## 2020-08-25 DIAGNOSIS — O40.3XX0 POLYHYDRAMNIOS, THIRD TRIMESTER, NOT APPLICABLE OR UNSPECIFIED: ICD-10-CM

## 2020-08-25 DIAGNOSIS — Z3A.38 38 WEEKS GESTATION OF PREGNANCY: ICD-10-CM

## 2020-08-25 DIAGNOSIS — Z34.90 ENCOUNTER FOR SUPERVISION OF NORMAL PREGNANCY, UNSPECIFIED, UNSPECIFIED TRIMESTER: ICD-10-CM

## 2020-08-25 LAB
AMPHET UR-MCNC: NEGATIVE — SIGNIFICANT CHANGE UP
APPEARANCE UR: CLEAR — SIGNIFICANT CHANGE UP
BACTERIA # UR AUTO: NEGATIVE — SIGNIFICANT CHANGE UP
BARBITURATES UR SCN-MCNC: NEGATIVE — SIGNIFICANT CHANGE UP
BASOPHILS # BLD AUTO: 0.02 K/UL — SIGNIFICANT CHANGE UP (ref 0–0.2)
BASOPHILS NFR BLD AUTO: 0.2 % — SIGNIFICANT CHANGE UP (ref 0–1)
BENZODIAZ UR-MCNC: NEGATIVE — SIGNIFICANT CHANGE UP
BILIRUB UR-MCNC: NEGATIVE — SIGNIFICANT CHANGE UP
BLD GP AB SCN SERPL QL: SIGNIFICANT CHANGE UP
BUPRENORPHINE SCREEN, URINE RESULT: NEGATIVE — SIGNIFICANT CHANGE UP
COCAINE METAB.OTHER UR-MCNC: NEGATIVE — SIGNIFICANT CHANGE UP
COLOR SPEC: YELLOW — SIGNIFICANT CHANGE UP
DIFF PNL FLD: NEGATIVE — SIGNIFICANT CHANGE UP
EOSINOPHIL # BLD AUTO: 0.05 K/UL — SIGNIFICANT CHANGE UP (ref 0–0.7)
EOSINOPHIL NFR BLD AUTO: 0.5 % — SIGNIFICANT CHANGE UP (ref 0–8)
EPI CELLS # UR: 9 /HPF — HIGH (ref 0–5)
FENTANYL UR QL: NEGATIVE — SIGNIFICANT CHANGE UP
GLUCOSE UR QL: NEGATIVE — SIGNIFICANT CHANGE UP
HCT VFR BLD CALC: 39.4 % — SIGNIFICANT CHANGE UP (ref 37–47)
HGB BLD-MCNC: 12.9 G/DL — SIGNIFICANT CHANGE UP (ref 12–16)
HYALINE CASTS # UR AUTO: 3 /LPF — SIGNIFICANT CHANGE UP (ref 0–7)
IMM GRANULOCYTES NFR BLD AUTO: 0.5 % — HIGH (ref 0.1–0.3)
KETONES UR-MCNC: NEGATIVE — SIGNIFICANT CHANGE UP
L&D DRUG SCREEN, URINE: SIGNIFICANT CHANGE UP
LEUKOCYTE ESTERASE UR-ACNC: ABNORMAL
LYMPHOCYTES # BLD AUTO: 1.66 K/UL — SIGNIFICANT CHANGE UP (ref 1.2–3.4)
LYMPHOCYTES # BLD AUTO: 16.3 % — LOW (ref 20.5–51.1)
MCHC RBC-ENTMCNC: 28.5 PG — SIGNIFICANT CHANGE UP (ref 27–31)
MCHC RBC-ENTMCNC: 32.7 G/DL — SIGNIFICANT CHANGE UP (ref 32–37)
MCV RBC AUTO: 87 FL — SIGNIFICANT CHANGE UP (ref 81–99)
METHADONE UR-MCNC: NEGATIVE — SIGNIFICANT CHANGE UP
MONOCYTES # BLD AUTO: 0.57 K/UL — SIGNIFICANT CHANGE UP (ref 0.1–0.6)
MONOCYTES NFR BLD AUTO: 5.6 % — SIGNIFICANT CHANGE UP (ref 1.7–9.3)
NEUTROPHILS # BLD AUTO: 7.86 K/UL — HIGH (ref 1.4–6.5)
NEUTROPHILS NFR BLD AUTO: 76.9 % — HIGH (ref 42.2–75.2)
NITRITE UR-MCNC: NEGATIVE — SIGNIFICANT CHANGE UP
NRBC # BLD: 0 /100 WBCS — SIGNIFICANT CHANGE UP (ref 0–0)
OPIATES UR-MCNC: NEGATIVE — SIGNIFICANT CHANGE UP
OXYCODONE UR-MCNC: NEGATIVE — SIGNIFICANT CHANGE UP
PCP UR-MCNC: NEGATIVE — SIGNIFICANT CHANGE UP
PH UR: 6 — SIGNIFICANT CHANGE UP (ref 5–8)
PLATELET # BLD AUTO: 212 K/UL — SIGNIFICANT CHANGE UP (ref 130–400)
PRENATAL SYPHILIS TEST: SIGNIFICANT CHANGE UP
PROPOXYPHENE QUALITATIVE URINE RESULT: NEGATIVE — SIGNIFICANT CHANGE UP
PROT UR-MCNC: SIGNIFICANT CHANGE UP
RBC # BLD: 4.53 M/UL — SIGNIFICANT CHANGE UP (ref 4.2–5.4)
RBC # FLD: 13.3 % — SIGNIFICANT CHANGE UP (ref 11.5–14.5)
RBC CASTS # UR COMP ASSIST: 2 /HPF — SIGNIFICANT CHANGE UP (ref 0–4)
SP GR SPEC: 1.02 — SIGNIFICANT CHANGE UP (ref 1.01–1.02)
UROBILINOGEN FLD QL: SIGNIFICANT CHANGE UP
WBC # BLD: 10.21 K/UL — SIGNIFICANT CHANGE UP (ref 4.8–10.8)
WBC # FLD AUTO: 10.21 K/UL — SIGNIFICANT CHANGE UP (ref 4.8–10.8)
WBC UR QL: 12 /HPF — HIGH (ref 0–5)

## 2020-08-25 PROCEDURE — 59400 OBSTETRICAL CARE: CPT | Mod: U9

## 2020-08-25 RX ORDER — SODIUM CHLORIDE 9 MG/ML
3 INJECTION INTRAMUSCULAR; INTRAVENOUS; SUBCUTANEOUS EVERY 8 HOURS
Refills: 0 | Status: DISCONTINUED | OUTPATIENT
Start: 2020-08-25 | End: 2020-08-28

## 2020-08-25 RX ORDER — ONDANSETRON 8 MG/1
4 TABLET, FILM COATED ORAL EVERY 6 HOURS
Refills: 0 | Status: DISCONTINUED | OUTPATIENT
Start: 2020-08-25 | End: 2020-08-25

## 2020-08-25 RX ORDER — SIMETHICONE 80 MG/1
80 TABLET, CHEWABLE ORAL EVERY 4 HOURS
Refills: 0 | Status: DISCONTINUED | OUTPATIENT
Start: 2020-08-25 | End: 2020-08-28

## 2020-08-25 RX ORDER — OXYTOCIN 10 UNIT/ML
2 VIAL (ML) INJECTION
Qty: 30 | Refills: 0 | Status: DISCONTINUED | OUTPATIENT
Start: 2020-08-25 | End: 2020-08-25

## 2020-08-25 RX ORDER — KETOROLAC TROMETHAMINE 30 MG/ML
30 SYRINGE (ML) INJECTION ONCE
Refills: 0 | Status: DISCONTINUED | OUTPATIENT
Start: 2020-08-25 | End: 2020-08-25

## 2020-08-25 RX ORDER — DEXAMETHASONE 0.5 MG/5ML
4 ELIXIR ORAL EVERY 6 HOURS
Refills: 0 | Status: DISCONTINUED | OUTPATIENT
Start: 2020-08-25 | End: 2020-08-25

## 2020-08-25 RX ORDER — OXYTOCIN 10 UNIT/ML
333.33 VIAL (ML) INJECTION
Qty: 20 | Refills: 0 | Status: DISCONTINUED | OUTPATIENT
Start: 2020-08-25 | End: 2020-08-28

## 2020-08-25 RX ORDER — SODIUM CHLORIDE 9 MG/ML
1000 INJECTION, SOLUTION INTRAVENOUS
Refills: 0 | Status: DISCONTINUED | OUTPATIENT
Start: 2020-08-25 | End: 2020-08-25

## 2020-08-25 RX ORDER — OXYTOCIN 10 UNIT/ML
333.33 VIAL (ML) INJECTION
Qty: 20 | Refills: 0 | Status: DISCONTINUED | OUTPATIENT
Start: 2020-08-25 | End: 2020-08-25

## 2020-08-25 RX ORDER — HYDROCORTISONE 1 %
1 OINTMENT (GRAM) TOPICAL EVERY 6 HOURS
Refills: 0 | Status: DISCONTINUED | OUTPATIENT
Start: 2020-08-25 | End: 2020-08-28

## 2020-08-25 RX ORDER — BENZOCAINE 10 %
1 GEL (GRAM) MUCOUS MEMBRANE EVERY 6 HOURS
Refills: 0 | Status: DISCONTINUED | OUTPATIENT
Start: 2020-08-25 | End: 2020-08-28

## 2020-08-25 RX ORDER — BUPIVACAINE HCL/PF 7.5 MG/ML
200 VIAL (ML) INJECTION
Refills: 0 | Status: DISCONTINUED | OUTPATIENT
Start: 2020-08-25 | End: 2020-08-25

## 2020-08-25 RX ORDER — OXYCODONE HYDROCHLORIDE 5 MG/1
5 TABLET ORAL
Refills: 0 | Status: DISCONTINUED | OUTPATIENT
Start: 2020-08-25 | End: 2020-08-28

## 2020-08-25 RX ORDER — NALOXONE HYDROCHLORIDE 4 MG/.1ML
0.1 SPRAY NASAL
Refills: 0 | Status: DISCONTINUED | OUTPATIENT
Start: 2020-08-25 | End: 2020-08-25

## 2020-08-25 RX ORDER — CITRIC ACID/SODIUM CITRATE 300-500 MG
15 SOLUTION, ORAL ORAL EVERY 6 HOURS
Refills: 0 | Status: DISCONTINUED | OUTPATIENT
Start: 2020-08-25 | End: 2020-08-25

## 2020-08-25 RX ORDER — PRAMOXINE HYDROCHLORIDE 150 MG/15G
1 AEROSOL, FOAM RECTAL EVERY 4 HOURS
Refills: 0 | Status: DISCONTINUED | OUTPATIENT
Start: 2020-08-25 | End: 2020-08-28

## 2020-08-25 RX ORDER — IBUPROFEN 200 MG
600 TABLET ORAL EVERY 6 HOURS
Refills: 0 | Status: COMPLETED | OUTPATIENT
Start: 2020-08-25 | End: 2021-07-24

## 2020-08-25 RX ORDER — DIBUCAINE 1 %
1 OINTMENT (GRAM) RECTAL EVERY 6 HOURS
Refills: 0 | Status: DISCONTINUED | OUTPATIENT
Start: 2020-08-25 | End: 2020-08-28

## 2020-08-25 RX ORDER — AER TRAVELER 0.5 G/1
1 SOLUTION RECTAL; TOPICAL EVERY 4 HOURS
Refills: 0 | Status: DISCONTINUED | OUTPATIENT
Start: 2020-08-25 | End: 2020-08-28

## 2020-08-25 RX ORDER — LANOLIN
1 OINTMENT (GRAM) TOPICAL EVERY 6 HOURS
Refills: 0 | Status: DISCONTINUED | OUTPATIENT
Start: 2020-08-25 | End: 2020-08-28

## 2020-08-25 RX ORDER — OXYCODONE HYDROCHLORIDE 5 MG/1
5 TABLET ORAL ONCE
Refills: 0 | Status: DISCONTINUED | OUTPATIENT
Start: 2020-08-25 | End: 2020-08-28

## 2020-08-25 RX ORDER — ACETAMINOPHEN 500 MG
975 TABLET ORAL
Refills: 0 | Status: DISCONTINUED | OUTPATIENT
Start: 2020-08-25 | End: 2020-08-28

## 2020-08-25 RX ORDER — DIPHENHYDRAMINE HCL 50 MG
25 CAPSULE ORAL EVERY 6 HOURS
Refills: 0 | Status: DISCONTINUED | OUTPATIENT
Start: 2020-08-25 | End: 2020-08-28

## 2020-08-25 RX ORDER — MAGNESIUM HYDROXIDE 400 MG/1
30 TABLET, CHEWABLE ORAL
Refills: 0 | Status: DISCONTINUED | OUTPATIENT
Start: 2020-08-25 | End: 2020-08-28

## 2020-08-25 RX ADMIN — Medication 975 MILLIGRAM(S): at 21:28

## 2020-08-25 RX ADMIN — Medication 30 MILLIGRAM(S): at 19:05

## 2020-08-25 RX ADMIN — SODIUM CHLORIDE 3 MILLILITER(S): 9 INJECTION INTRAMUSCULAR; INTRAVENOUS; SUBCUTANEOUS at 22:05

## 2020-08-25 RX ADMIN — Medication 2 MILLIUNIT(S)/MIN: at 10:13

## 2020-08-25 RX ADMIN — Medication 975 MILLIGRAM(S): at 22:00

## 2020-08-25 RX ADMIN — Medication 30 MILLIGRAM(S): at 20:12

## 2020-08-25 NOTE — PROGRESS NOTE ADULT - ASSESSMENT
30y  at 39w0d GBS negative, epidural in place, on pitocin, undergoing IOL  - Patient repositioned, fluid and oxygen given, tracing recovering  - Continuous EFM and toco  - IV fluids  - Clear liquid diet  - Pain management PRN  - Anticipate     Discussed with Dr. Rodriguez

## 2020-08-25 NOTE — OB PROVIDER DELIVERY SUMMARY - NSPROVIDERDELIVERYNOTE_OBGYN_ALL_OB_FT
Patient was fully dilated and pushing. Fetal head was OA and restituted to ROT. The anterior and posterior shoulders delivered, followed by the remaining body atraumatically. The  was handed to the mother and the RN. Delayed cord clamping was performed, and then clamped and cut. The placenta delivered intact with membranes. Pitocin was administered. Uterus massaged, fundus found to be firm. Cervix, vagina and perineum inspected. Second degree laceration was noted, repaired using 2-0 chromic in the usual fashion with good hemostasis.     Viable female infant delivered, weighing [id number], with APGARs 9/9    Laceration: 2nd degree  EBL 300cc    Dr. Rodriguez, Dr Montero, and Dr. Kim present for the delivery Patient was fully dilated and pushing. Fetal head was OA and restituted to ROT. The anterior and posterior shoulders delivered, followed by the remaining body atraumatically. The  was handed to the mother and the RN. Delayed cord clamping was performed, and then clamped and cut. Cord blood collected. The placenta delivered intact with membranes. Pitocin was administered. Uterus massaged, fundus found to be firm. Cervix, vagina and perineum inspected. Second degree laceration was noted, repaired using 2-0 chromic in the usual fashion with good hemostasis.     Viable female infant delivered, weighing [id number], with APGARs 9/9    Laceration: 2nd degree  EBL 300cc    Dr. Rodriguez, Dr Montero, and Dr. Kim present for the delivery Patient was fully dilated and pushing. Fetal head was OA and restituted to ROT. The anterior and posterior shoulders delivered, followed by the remaining body atraumatically. The  was handed to the mother and the RN. Delayed cord clamping was performed, and then clamped and cut. Cord blood collected. The placenta delivered intact with membranes. Pitocin was administered. Uterus massaged, fundus found to be firm. Cervix, vagina and perineum inspected. Second degree laceration was noted, repaired using 2-0 chromic in the usual fashion with good hemostasis.     Viable female infant delivered, weighing [id number], with APGARs 9/9    Laceration: 2nd degree  EBL 300cc    Dr. Rodriguez, Dr Montero, and Dr. Kim present for the delivery    Attending (Michael): uncomplicated , agree with resident's documentation

## 2020-08-25 NOTE — OB PROVIDER H&P - NSHPLABSRESULTS_GEN_ALL_CORE
Measles nonimmune    GCT 92  Measles non-immune    Sono @ 7.1wks S=D  Sono @ 11.1wks S=D, NT WNL  Sono @ 19.1wks S=D, nl anatomy, EFW: 343g, 95%  Sono @ 32.3wks S=D, vtx, ant placenta, EFW: 2589g, 6kh97jc (98%)  Sono @ 37.3wks S=D, vtx, ant placenta, Poly MVP 8.24cm, EFW: 3196g, 7lb1oz (57%)  Sono @ 38.3wks BPP 8/8

## 2020-08-25 NOTE — OB PROVIDER H&P - NSHPPHYSICALEXAM_GEN_ALL_CORE
T(C): 36.6 (08-25-20 @ 08:18), Max: 36.6 (08-25-20 @ 08:18)  HR: 100 (08-25-20 @ 08:45) (100 - 101)  BP: 112/75 (08-25-20 @ 08:45) (112/75 - 123/80)  RR: 16 (08-25-20 @ 08:18) (16 - 16)    Abd: gravid, soft, NT  VE:   Ctx: none  FHR: 140 moderate variability, Cat I T(C): 36.6 (08-25-20 @ 08:18), Max: 36.6 (08-25-20 @ 08:18)  HR: 100 (08-25-20 @ 08:45) (100 - 101)  BP: 112/75 (08-25-20 @ 08:45) (112/75 - 123/80)  RR: 16 (08-25-20 @ 08:18) (16 - 16)    Abd: gravid, soft, NT  VE: 2/50/-3/soft/mid  Ctx: none  FHR: 140 moderate variability, Cat I

## 2020-08-25 NOTE — OB PROVIDER H&P - HISTORY OF PRESENT ILLNESS
Pt is a 30y.o.  @ 39wks by LMP presents for elective IOL term with favoarble cervix. Pt denies ctx, LOF or VB and states good FM    Pt tested negative for Covid-19 on 2020 Pt is a 30y.o.  @ 39wks by LMP presents for elective IOL term with favoarble cervix. Pt denies ctx, LOF or VB and states good FM. Pt had Bell's Palsy in 1st trimester which resolved with Steroids.     Pt tested negative for Covid-19 on 2020

## 2020-08-25 NOTE — OB PROVIDER H&P - ASSESSMENT
30y.o.  @ 39wks IOL at term with favorable cervix, GBS negative  Admit to L&D  IVF, labs  Continuous efm and toco  Pain management PRN  Pitocin IOL  Anticipate   Dr. Martel Aware

## 2020-08-25 NOTE — OB PROVIDER H&P - ALERT: PERTINENT HISTORY
1st Trimester Sonogram 1st Trimester Sonogram/Follow up Sonogram for Growth/20 Week Level II Sonogram

## 2020-08-26 ENCOUNTER — TRANSCRIPTION ENCOUNTER (OUTPATIENT)
Age: 30
End: 2020-08-26

## 2020-08-26 LAB
BASOPHILS # BLD AUTO: 0.01 K/UL — SIGNIFICANT CHANGE UP (ref 0–0.2)
BASOPHILS NFR BLD AUTO: 0.1 % — SIGNIFICANT CHANGE UP (ref 0–1)
EOSINOPHIL # BLD AUTO: 0.04 K/UL — SIGNIFICANT CHANGE UP (ref 0–0.7)
EOSINOPHIL NFR BLD AUTO: 0.4 % — SIGNIFICANT CHANGE UP (ref 0–8)
HCT VFR BLD CALC: 35.1 % — LOW (ref 37–47)
HGB BLD-MCNC: 11.3 G/DL — LOW (ref 12–16)
IMM GRANULOCYTES NFR BLD AUTO: 0.6 % — HIGH (ref 0.1–0.3)
LYMPHOCYTES # BLD AUTO: 1.45 K/UL — SIGNIFICANT CHANGE UP (ref 1.2–3.4)
LYMPHOCYTES # BLD AUTO: 13.7 % — LOW (ref 20.5–51.1)
MCHC RBC-ENTMCNC: 28.3 PG — SIGNIFICANT CHANGE UP (ref 27–31)
MCHC RBC-ENTMCNC: 32.2 G/DL — SIGNIFICANT CHANGE UP (ref 32–37)
MCV RBC AUTO: 87.8 FL — SIGNIFICANT CHANGE UP (ref 81–99)
MONOCYTES # BLD AUTO: 0.53 K/UL — SIGNIFICANT CHANGE UP (ref 0.1–0.6)
MONOCYTES NFR BLD AUTO: 5 % — SIGNIFICANT CHANGE UP (ref 1.7–9.3)
NEUTROPHILS # BLD AUTO: 8.51 K/UL — HIGH (ref 1.4–6.5)
NEUTROPHILS NFR BLD AUTO: 80.2 % — HIGH (ref 42.2–75.2)
NRBC # BLD: 0 /100 WBCS — SIGNIFICANT CHANGE UP (ref 0–0)
PLATELET # BLD AUTO: 173 K/UL — SIGNIFICANT CHANGE UP (ref 130–400)
RBC # BLD: 4 M/UL — LOW (ref 4.2–5.4)
RBC # FLD: 13.4 % — SIGNIFICANT CHANGE UP (ref 11.5–14.5)
WBC # BLD: 10.6 K/UL — SIGNIFICANT CHANGE UP (ref 4.8–10.8)
WBC # FLD AUTO: 10.6 K/UL — SIGNIFICANT CHANGE UP (ref 4.8–10.8)

## 2020-08-26 PROCEDURE — ZZZZZ: CPT

## 2020-08-26 RX ORDER — SENNA PLUS 8.6 MG/1
1 TABLET ORAL DAILY
Refills: 0 | Status: DISCONTINUED | OUTPATIENT
Start: 2020-08-26 | End: 2020-08-28

## 2020-08-26 RX ORDER — ACETAMINOPHEN 500 MG
3 TABLET ORAL
Qty: 0 | Refills: 0 | DISCHARGE
Start: 2020-08-26

## 2020-08-26 RX ORDER — IBUPROFEN 200 MG
1 TABLET ORAL
Qty: 0 | Refills: 0 | DISCHARGE
Start: 2020-08-26

## 2020-08-26 RX ORDER — IBUPROFEN 200 MG
600 TABLET ORAL EVERY 6 HOURS
Refills: 0 | Status: DISCONTINUED | OUTPATIENT
Start: 2020-08-26 | End: 2020-08-28

## 2020-08-26 RX ADMIN — Medication 975 MILLIGRAM(S): at 10:07

## 2020-08-26 RX ADMIN — Medication 975 MILLIGRAM(S): at 15:02

## 2020-08-26 RX ADMIN — SODIUM CHLORIDE 3 MILLILITER(S): 9 INJECTION INTRAMUSCULAR; INTRAVENOUS; SUBCUTANEOUS at 15:01

## 2020-08-26 RX ADMIN — Medication 600 MILLIGRAM(S): at 00:28

## 2020-08-26 RX ADMIN — Medication 600 MILLIGRAM(S): at 19:00

## 2020-08-26 RX ADMIN — Medication 600 MILLIGRAM(S): at 11:30

## 2020-08-26 RX ADMIN — Medication 600 MILLIGRAM(S): at 06:25

## 2020-08-26 RX ADMIN — Medication 600 MILLIGRAM(S): at 01:00

## 2020-08-26 RX ADMIN — Medication 975 MILLIGRAM(S): at 03:29

## 2020-08-26 RX ADMIN — SODIUM CHLORIDE 3 MILLILITER(S): 9 INJECTION INTRAMUSCULAR; INTRAVENOUS; SUBCUTANEOUS at 06:24

## 2020-08-26 RX ADMIN — Medication 600 MILLIGRAM(S): at 18:26

## 2020-08-26 RX ADMIN — SODIUM CHLORIDE 3 MILLILITER(S): 9 INJECTION INTRAMUSCULAR; INTRAVENOUS; SUBCUTANEOUS at 21:48

## 2020-08-26 RX ADMIN — Medication 975 MILLIGRAM(S): at 20:41

## 2020-08-26 RX ADMIN — Medication 1 SPRAY(S): at 00:07

## 2020-08-26 RX ADMIN — Medication 975 MILLIGRAM(S): at 21:11

## 2020-08-26 RX ADMIN — Medication 1 TABLET(S): at 11:30

## 2020-08-26 RX ADMIN — Medication 975 MILLIGRAM(S): at 04:00

## 2020-08-26 NOTE — DISCHARGE NOTE OB - HOSPITAL COURSE
DATE OF DISCHARGE: 20 @ 08:13    HISTORY OF PRESENT ILLNESS/HOSPITAL COURSE: HPI:  Pt is a 30y.o.  @ 39wks by LMP presents for elective IOL term with favoarble cervix. Pt denies ctx, LOF or VB and states good FM. Pt had Bell's Palsy in 1st trimester which resolved with Steroids.     Pt tested negative for Covid-19 on 2020 (25 Aug 2020 08:46)    PAST MEDICAL & SURGICAL HISTORY:  Bell's palsy  Migraine  Anxiety  H/O rhinoplasty  H/O breast augmentation      PROCEDURES PERFORMED: Term spontaneous vaginal delivery  COMPLICATIONS:  -----   POST PARTUM COURSE: uncomplicated, discharged home on PPD1  FINAL DIAGNOSIS:  Status post normal spontaneous vaginal delivery at Gestational Age    DISCHARGE CBC:                       11.3   10.60 )-----------( 173      ( 26 Aug 2020 06:45 )             35.1     DISCHARGE INSTRUCTIONS:  If you have severe abdominal pain, heavy vaginal bleeding, shortness of breath or chest pain please call your doctor or come to the emergency room.   DIET:  Advance as tolerated.  ACTIVITY:  Advance as tolerated.  Pelvic rest for 6 weeks.  Nothing to be inserted into the vagina for 6 weeks, i.e. no tampons, douching, sexual relations, or tub baths.   FOLLOW UP: Make an appointment to see your doctor as instructed   PRESCRIPTIONS: Prescriptions:

## 2020-08-26 NOTE — DISCHARGE NOTE OB - CARE PLAN
Principal Discharge DX:	Vaginal delivery  Goal:	healthy mom and baby  Assessment and plan of treatment:	vitals and labs

## 2020-08-26 NOTE — PROGRESS NOTE ADULT - ASSESSMENT
31yo P2 S/p  PPD#1 , doing well    - Continue routine postpartum care  - Pain management prn  - Encourage ambulation, oral hydration  - Monitor vitals and bleeding  -anticipate d/c home today    Dr. sherwood and OBGYN attending to be made aware 31yo P2 S/p  PPD#1 , doing well    - Continue routine postpartum care  - Pain management prn  - Encourage ambulation, oral hydration  - Monitor vitals and bleeding  -anticipate d/c home today    Dr. sherwood and Dr Rodriguez aware

## 2020-08-26 NOTE — DISCHARGE NOTE OB - MEDICATION SUMMARY - MEDICATIONS TO TAKE
I will START or STAY ON the medications listed below when I get home from the hospital:    acetaminophen 325 mg oral tablet  -- 3 tab(s) by mouth every 6 hours, As Needed - for moderate pain  -- Indication: For pain    ibuprofen 600 mg oral tablet  -- 1 tab(s) by mouth every 6 hours, As Needed - for moderate pain  -- Indication: For pain

## 2020-08-26 NOTE — DISCHARGE NOTE OB - PATIENT PORTAL LINK FT
You can access the FollowMyHealth Patient Portal offered by [facility] by registering at the following website: [email or web address]. By joining [facility]’s FollowMyHealth portal, you will also be able to view your health information using other applications (apps) compatible with our system.

## 2020-08-26 NOTE — DISCHARGE NOTE OB - CARE PROVIDER_API CALL
Lidia Goodman)  OBSGYN  Physicians  [address] [place] [address]  Phone: [phone]  Fax: [phone]  Follow Up Time:

## 2020-08-27 LAB
SARS-COV-2 IGG SERPL QL IA: NEGATIVE — SIGNIFICANT CHANGE UP
SARS-COV-2 IGM SERPL IA-ACNC: 4.76 AU/ML — SIGNIFICANT CHANGE UP

## 2020-08-27 PROCEDURE — ZZZZZ: CPT

## 2020-08-27 RX ADMIN — Medication 600 MILLIGRAM(S): at 19:25

## 2020-08-27 RX ADMIN — Medication 600 MILLIGRAM(S): at 12:15

## 2020-08-27 RX ADMIN — SODIUM CHLORIDE 3 MILLILITER(S): 9 INJECTION INTRAMUSCULAR; INTRAVENOUS; SUBCUTANEOUS at 05:35

## 2020-08-27 RX ADMIN — Medication 975 MILLIGRAM(S): at 20:23

## 2020-08-27 RX ADMIN — Medication 975 MILLIGRAM(S): at 15:09

## 2020-08-27 RX ADMIN — Medication 1 TABLET(S): at 12:14

## 2020-08-27 RX ADMIN — Medication 975 MILLIGRAM(S): at 09:00

## 2020-08-27 RX ADMIN — Medication 975 MILLIGRAM(S): at 08:29

## 2020-08-27 NOTE — PROGRESS NOTE ADULT - ASSESSMENT
31yo P2 S/p  PPD#2 , doing well    - Continue routine postpartum care  - Pain management prn  - Encourage ambulation, oral hydration  - Monitor vitals and bleeding  -anticipate d/c home today if baby is cleared    Dr. sherwood and Dr Rodriguez aware

## 2020-08-28 VITALS
DIASTOLIC BLOOD PRESSURE: 68 MMHG | HEART RATE: 92 BPM | RESPIRATION RATE: 18 BRPM | TEMPERATURE: 97 F | SYSTOLIC BLOOD PRESSURE: 125 MMHG

## 2020-08-28 PROCEDURE — ZZZZZ: CPT

## 2020-08-28 RX ADMIN — Medication 1 TABLET(S): at 11:14

## 2020-08-28 RX ADMIN — Medication 975 MILLIGRAM(S): at 14:05

## 2020-08-28 RX ADMIN — Medication 600 MILLIGRAM(S): at 01:25

## 2020-08-28 RX ADMIN — Medication 975 MILLIGRAM(S): at 15:51

## 2020-08-28 RX ADMIN — Medication 600 MILLIGRAM(S): at 00:55

## 2020-08-28 RX ADMIN — Medication 600 MILLIGRAM(S): at 06:32

## 2020-08-28 RX ADMIN — Medication 600 MILLIGRAM(S): at 18:03

## 2020-08-28 RX ADMIN — Medication 600 MILLIGRAM(S): at 13:00

## 2020-08-28 RX ADMIN — Medication 600 MILLIGRAM(S): at 11:15

## 2020-08-28 RX ADMIN — Medication 975 MILLIGRAM(S): at 10:00

## 2020-08-28 RX ADMIN — Medication 600 MILLIGRAM(S): at 07:29

## 2020-08-28 RX ADMIN — Medication 975 MILLIGRAM(S): at 08:43

## 2020-08-28 NOTE — PROGRESS NOTE ADULT - ASSESSMENT
29yo P2 S/p  PPD#3 , doing well    - Continue routine postpartum care  - Pain management prn  - Encourage ambulation, oral hydration  - Monitor vitals and bleeding  -anticipate d/c home today if baby is cleared    Dr. sherwood and Dr Rodriguez to be made aware 31yo P2 S/p  PPD#3 , doing well    - Continue routine postpartum care  - Pain management prn  - Encourage ambulation, oral hydration  - Monitor vitals and bleeding  -anticipate d/c home today, instructions and precautions discussed    Dr. sherwood and Dr Rodriguez aware

## 2020-08-28 NOTE — PROGRESS NOTE ADULT - SUBJECTIVE AND OBJECTIVE BOX
PA Progress note    Patient evaluated at bedside, c/o painful ctx, requesting epidural.    HR: 82 (16:47)  BP: 125/71 (16:47)    SVE:  3/70/-3  EFM:  q 2-4 min  TOCO:  135 moderate variability, cat i    Medications:  Pitocin @ 12milliunits        Labs:                        12.9   10.21 )-----------( 212      ( 25 Aug 2020 09:24 )             39.4           ABO RH Interpretation: O POS (20 @ 09:24)    Urinalysis Basic - ( 25 Aug 2020 09:24 )    Color: Yellow / Appearance: Clear / S.024 / pH: x  Gluc: x / Ketone: Negative  / Bili: Negative / Urobili: <2 mg/dL   Blood: x / Protein: Trace / Nitrite: Negative   Leuk Esterase: Moderate / RBC: 2 /HPF / WBC 12 /HPF   Sq Epi: x / Non Sq Epi: 9 /HPF / Bacteria: Negative        Prenatal Syphilis Test: Nonreact (20 @ 09:24)      NEG      A/P:  30y  at 39wks IOL at term with favorable cervix, GBS negative  -continue pitocin  -pain management prn  -cont efm/toco  -Dr. Montero/ Dr. Martel aware
PGY 1 Note:    Pt doing well, pain well controlled. Denies heavy vaginal bleeding. No overnight events, no acute complaints. Ambulating without difficulty, voiding, and tolerating regular diet. Breastfeeding.    PAST MEDICAL & SURGICAL HISTORY:  Bell's palsy  Migraine  Anxiety  H/O rhinoplasty  H/O breast augmentation      Physical Exam  Vital Signs Last 24 Hrs  T(F): 96.4 (27 Aug 2020 00:00), Max: 97.9 (26 Aug 2020 07:50)  HR: 92 (27 Aug 2020 00:00) (78 - 92)  BP: 101/58 (27 Aug 2020 00:00) (99/54 - 101/58)  RR: 18 (27 Aug 2020 00:00) (18 - 18)    Gen: AAOx3, NAD  Abd: Soft, nontender, nondistended  Fundus: Firm, nontender, below the umbilicus  Lochia: Minimal  Ext: No calf tenderness, no swelling    Labs:                        11.3   10.60 )-----------( 173      ( 26 Aug 2020 06:45 )             35.1                         12.9   10.21 )-----------( 212      ( 25 Aug 2020 09:24 )             39.4
PGY 1 Note:    Pt doing well, pain well controlled. Denies heavy vaginal bleeding. No overnight events, no acute complaints. Ambulating without difficulty, voiding, and tolerating regular diet. Breastfeeding.    PAST MEDICAL & SURGICAL HISTORY:  Bell's palsy  Migraine  Anxiety  H/O rhinoplasty  H/O breast augmentation      Physical Exam  Vital Signs Last 24 Hrs  T(F): 97 (26 Aug 2020 00:01), Max: 98.7 (25 Aug 2020 18:34)  HR: 92 (26 Aug 2020 00:01) (50 - 128)  BP: 106/56 (26 Aug 2020 00:01) (91/71 - 132/363)  RR: 18 (26 Aug 2020 00:01) (16 - 18)    Gen: AAOx3, NAD  Abd: Soft, nontender, nondistended  Fundus: Firm, nontender, below the umbilicus  Lochia: Minimal  Ext: No calf tenderness, no swelling    Labs:                        11.3   10.60 )-----------( 173      ( 26 Aug 2020 06:45 )             35.1                         12.9   10.21 )-----------( 212      ( 25 Aug 2020 09:24 )             39.4
PGY 1 Note:    Pt doing well, pain well controlled. Denies heavy vaginal bleeding. No overnight events, no acute complaints. Ambulating without difficulty, voiding, and tolerating regular diet. Breastfeeding.    PAST MEDICAL & SURGICAL HISTORY:  Bell's palsy  Migraine  Anxiety  H/O rhinoplasty  H/O breast augmentation      Physical Exam  Vital Signs Last 24 Hrs  T(F): 97 (27 Aug 2020 23:54), Max: 97 (27 Aug 2020 23:54)  HR: 99 (27 Aug 2020 23:54) (87 - 99)  BP: 121/76 (27 Aug 2020 23:54) (110/66 - 134/65)  RR: 18 (27 Aug 2020 23:54) (18 - 18)    Gen: AAOx3, NAD  Abd: Soft, nontender, nondistended  Fundus: Firm, nontender, below the umbilicus  Lochia: Minimal  Ext: No calf tenderness, no swelling    Labs:                        11.3   10.60 )-----------( 173      ( 26 Aug 2020 06:45 )             35.1                         12.9   10.21 )-----------( 212      ( 25 Aug 2020 09:24 )             39.4
Pt evaluated at bedside. Feeling some pelvic pressure, otherwise feeling well.    Vital Signs Last 24 Hrs  T(F): 97.8 (25 Aug 2020 08:18), Max: 97.8 (25 Aug 2020 08:18)  HR: 94 (25 Aug 2020 17:52) (80 - 116)  BP: 116/67 (25 Aug 2020 17:49) (96/75 - 131/63)  RR: 16 (25 Aug 2020 08:18) (16 - 16)    EFM: 140/mod/accels, variable decels  [place]: q2-3min  SVE: 7/80/-1 AROM clear    Labs:  Urine drug screen negative      Meds:  Pitocin at 10mu/min

## 2020-08-28 NOTE — PROGRESS NOTE ADULT - ATTENDING COMMENTS
PPD#2 doing well. Stayed one more night because baby was not discharged.  D/c home. F/u 6 weeks for postpartum visit.
PPD#3 doing well. Stayed extra night for baby - UV light treatments.   D/c home. F/u 6 weeks for postpartum visit.
PPD#1 doing well.  D/c home. F/u 6 weeks for postpartum visit.

## 2020-09-01 DIAGNOSIS — G43.909 MIGRAINE, UNSPECIFIED, NOT INTRACTABLE, WITHOUT STATUS MIGRAINOSUS: ICD-10-CM

## 2020-09-01 DIAGNOSIS — Z3A.39 39 WEEKS GESTATION OF PREGNANCY: ICD-10-CM

## 2020-09-01 DIAGNOSIS — O26.893 OTHER SPECIFIED PREGNANCY RELATED CONDITIONS, THIRD TRIMESTER: ICD-10-CM

## 2020-09-14 NOTE — OB RN TRIAGE NOTE - NS_TRIAGETIMEOF NOTIFICATION_OBGYN_ALL_OB_DT
Pt came in on 09/14/2020 at 12:06pm for a PPD test, which was administered in the left forearm.   Pt tolerated well, pt informed to come back in 2 days for reading of test.   27-Dec-2018 11:32

## 2020-09-24 RX ORDER — PNV NO.95/FERROUS FUM/FOLIC AC 28MG-0.8MG
28-0.8 TABLET ORAL
Qty: 30 | Refills: 2 | Status: DISCONTINUED | COMMUNITY
Start: 2020-02-27 | End: 2020-09-24

## 2020-09-24 RX ORDER — VITAMIN A ACETATE, .BETA.-CAROTENE, ASCORBIC ACID, CHOLECALCIFEROL, .ALPHA.-TOCOPHEROL ACETATE, DL-, THIAMINE MONONITRATE, RIBOFLAVIN, NIACINAMIDE, PYRIDOXINE HYDROCHLORIDE, FOLIC ACID, CYANOCOBALAMIN, CALCIUM CARBONATE, FERROUS FUMARATE, ZINC OXIDE, AND CUPRIC OXIDE 2000; 2000; 120; 400; 22; 1.84; 3; 20; 10; 1; 12; 200; 27; 25; 2 [IU]/1; [IU]/1; MG/1; [IU]/1; MG/1; MG/1; MG/1; MG/1; MG/1; MG/1; UG/1; MG/1; MG/1; MG/1; MG/1
27-1 TABLET ORAL DAILY
Qty: 30 | Refills: 6 | Status: ACTIVE | COMMUNITY
Start: 2020-09-24 | End: 1900-01-01

## 2020-09-25 PROBLEM — G51.0 BELL'S PALSY: Chronic | Status: ACTIVE | Noted: 2020-08-25

## 2020-10-08 ENCOUNTER — OUTPATIENT (OUTPATIENT)
Dept: OUTPATIENT SERVICES | Facility: HOSPITAL | Age: 30
LOS: 1 days | Discharge: HOME | End: 2020-10-08

## 2020-10-08 ENCOUNTER — RESULT CHARGE (OUTPATIENT)
Age: 30
End: 2020-10-08

## 2020-10-08 ENCOUNTER — APPOINTMENT (OUTPATIENT)
Dept: OBGYN | Facility: CLINIC | Age: 30
End: 2020-10-08
Payer: MEDICAID

## 2020-10-08 VITALS
HEIGHT: 63 IN | BODY MASS INDEX: 28.79 KG/M2 | WEIGHT: 162.5 LBS | DIASTOLIC BLOOD PRESSURE: 80 MMHG | SYSTOLIC BLOOD PRESSURE: 116 MMHG

## 2020-10-08 DIAGNOSIS — Z98.82 BREAST IMPLANT STATUS: Chronic | ICD-10-CM

## 2020-10-08 DIAGNOSIS — O40.3XX0 POLYHYDRAMNIOS, THIRD TRIMESTER, NOT APPLICABLE OR UNSPECIFIED: ICD-10-CM

## 2020-10-08 DIAGNOSIS — Z98.890 OTHER SPECIFIED POSTPROCEDURAL STATES: Chronic | ICD-10-CM

## 2020-10-08 LAB — HCG UR QL: NEGATIVE

## 2020-10-08 PROCEDURE — 0503F POSTPARTUM CARE VISIT: CPT

## 2020-10-08 PROCEDURE — 58300 INSERT INTRAUTERINE DEVICE: CPT

## 2020-10-08 RX ORDER — DOCUSATE SODIUM 100 MG/1
100 CAPSULE ORAL TWICE DAILY
Qty: 60 | Refills: 1 | Status: DISCONTINUED | COMMUNITY
Start: 2018-09-13 | End: 2020-10-08

## 2020-10-08 RX ORDER — ACYCLOVIR 400 MG/1
400 TABLET ORAL
Refills: 0 | Status: DISCONTINUED | COMMUNITY
End: 2020-10-08

## 2020-10-08 RX ORDER — PREDNISONE 20 MG/1
20 TABLET ORAL
Refills: 0 | Status: DISCONTINUED | COMMUNITY
End: 2020-10-08

## 2020-10-08 RX ORDER — NYSTATIN 100000 1/G
100000 POWDER TOPICAL
Qty: 1 | Refills: 2 | Status: DISCONTINUED | COMMUNITY
Start: 2020-02-27 | End: 2020-10-08

## 2020-10-08 RX ORDER — ONDANSETRON 4 MG/1
4 TABLET ORAL
Qty: 21 | Refills: 2 | Status: DISCONTINUED | COMMUNITY
Start: 2020-02-27 | End: 2020-10-08

## 2020-10-08 RX ORDER — ONDANSETRON 4 MG/1
4 TABLET ORAL
Qty: 24 | Refills: 0 | Status: DISCONTINUED | COMMUNITY
Start: 2020-01-22 | End: 2020-10-08

## 2020-10-08 RX ORDER — TERCONAZOLE 4 MG/G
0.4 CREAM VAGINAL
Qty: 1 | Refills: 3 | Status: DISCONTINUED | COMMUNITY
Start: 2020-06-12 | End: 2020-10-08

## 2020-10-08 RX ORDER — FAMOTIDINE 20 MG/1
20 TABLET, FILM COATED ORAL
Qty: 60 | Refills: 3 | Status: DISCONTINUED | COMMUNITY
Start: 2020-01-22 | End: 2020-10-08

## 2020-10-08 RX ADMIN — COPPER 0: 313.4 INTRAUTERINE DEVICE INTRAUTERINE at 00:00

## 2020-10-12 LAB
C TRACH RRNA SPEC QL NAA+PROBE: DETECTED
N GONORRHOEA RRNA SPEC QL NAA+PROBE: NOT DETECTED
SOURCE AMPLIFICATION: NORMAL

## 2020-10-12 RX ORDER — AZITHROMYCIN 1 G/1
1 POWDER, FOR SUSPENSION ORAL
Qty: 2 | Refills: 0 | Status: ACTIVE | COMMUNITY
Start: 2020-10-12 | End: 1900-01-01

## 2020-10-29 ENCOUNTER — APPOINTMENT (OUTPATIENT)
Dept: OBGYN | Facility: CLINIC | Age: 30
End: 2020-10-29
Payer: MEDICAID

## 2020-10-29 ENCOUNTER — OUTPATIENT (OUTPATIENT)
Dept: OUTPATIENT SERVICES | Facility: HOSPITAL | Age: 30
LOS: 1 days | Discharge: HOME | End: 2020-10-29

## 2020-10-29 VITALS — DIASTOLIC BLOOD PRESSURE: 70 MMHG | SYSTOLIC BLOOD PRESSURE: 106 MMHG | HEIGHT: 63 IN

## 2020-10-29 DIAGNOSIS — Z98.890 OTHER SPECIFIED POSTPROCEDURAL STATES: Chronic | ICD-10-CM

## 2020-10-29 DIAGNOSIS — N39.3 STRESS INCONTINENCE (FEMALE) (MALE): ICD-10-CM

## 2020-10-29 DIAGNOSIS — Z98.82 BREAST IMPLANT STATUS: Chronic | ICD-10-CM

## 2020-10-29 DIAGNOSIS — Z30.430 ENCOUNTER FOR INSERTION OF INTRAUTERINE CONTRACEPTIVE DEVICE: ICD-10-CM

## 2020-10-29 PROCEDURE — 99212 OFFICE O/P EST SF 10 MIN: CPT

## 2020-10-29 RX ORDER — METRONIDAZOLE 7.5 MG/G
0.75 GEL VAGINAL
Qty: 1 | Refills: 1 | Status: ACTIVE | COMMUNITY
Start: 2020-10-29 | End: 1900-01-01

## 2020-10-30 DIAGNOSIS — A56.8 SEXUALLY TRANSMITTED CHLAMYDIAL INFECTION OF OTHER SITES: ICD-10-CM

## 2020-10-30 DIAGNOSIS — N39.3 STRESS INCONTINENCE (FEMALE) (MALE): ICD-10-CM

## 2020-10-30 DIAGNOSIS — Z30.431 ENCOUNTER FOR ROUTINE CHECKING OF INTRAUTERINE CONTRACEPTIVE DEVICE: ICD-10-CM

## 2020-10-30 PROBLEM — Z30.430 ENCOUNTER FOR INSERTION OF COPPER INTRAUTERINE CONTRACEPTIVE DEVICE (IUD): Status: RESOLVED | Noted: 2020-10-08 | Resolved: 2020-10-30

## 2020-11-05 ENCOUNTER — OUTPATIENT (OUTPATIENT)
Dept: OUTPATIENT SERVICES | Facility: HOSPITAL | Age: 30
LOS: 1 days | Discharge: HOME | End: 2020-11-05

## 2020-11-05 ENCOUNTER — APPOINTMENT (OUTPATIENT)
Dept: OBGYN | Facility: CLINIC | Age: 30
End: 2020-11-05
Payer: MEDICAID

## 2020-11-05 VITALS
WEIGHT: 155.38 LBS | DIASTOLIC BLOOD PRESSURE: 80 MMHG | SYSTOLIC BLOOD PRESSURE: 120 MMHG | HEIGHT: 63 IN | BODY MASS INDEX: 27.53 KG/M2

## 2020-11-05 DIAGNOSIS — N89.8 OTHER SPECIFIED NONINFLAMMATORY DISORDERS OF VAGINA: ICD-10-CM

## 2020-11-05 DIAGNOSIS — Z98.890 OTHER SPECIFIED POSTPROCEDURAL STATES: Chronic | ICD-10-CM

## 2020-11-05 DIAGNOSIS — A56.8 SEXUALLY TRANSMITTED CHLAMYDIAL INFECTION OF OTHER SITES: ICD-10-CM

## 2020-11-05 DIAGNOSIS — Z98.82 BREAST IMPLANT STATUS: Chronic | ICD-10-CM

## 2020-11-05 DIAGNOSIS — Z30.431 ENCOUNTER FOR ROUTINE CHECKING OF INTRAUTERINE CONTRACEPTIVE DEVICE: ICD-10-CM

## 2020-11-05 PROCEDURE — 99211 OFF/OP EST MAY X REQ PHY/QHP: CPT

## 2020-11-10 DIAGNOSIS — N89.8 OTHER SPECIFIED NONINFLAMMATORY DISORDERS OF VAGINA: ICD-10-CM

## 2020-11-10 DIAGNOSIS — A56.8 SEXUALLY TRANSMITTED CHLAMYDIAL INFECTION OF OTHER SITES: ICD-10-CM

## 2020-11-11 ENCOUNTER — NON-APPOINTMENT (OUTPATIENT)
Age: 30
End: 2020-11-11

## 2021-02-05 NOTE — PRE-ANESTHESIA EVALUATION ADULT - NSANTHDIETYNSD_GEN_ALL_CORE
Yes Banner Transposition Flap Text: The defect edges were debeveled with a #15 scalpel blade.  Given the location of the defect and the proximity to free margins a Banner transposition flap was deemed most appropriate.  Using a sterile surgical marker, an appropriate flap drawn around the defect. The area thus outlined was incised deep to adipose tissue with a #15 scalpel blade.  The skin margins were undermined to an appropriate distance in all directions utilizing iris scissors.

## 2022-01-18 NOTE — OB RN PATIENT PROFILE - 'COMMUNITY AGENCIES/SUPPORT GROUPS, OB PROFILE
Pt c/o chest pain radiating to neck and left arm   - On adm , RR 20 sat 100% on RA--> 95% on 3L, Trop I 7.5-->5.8  - EKG: sinus tachycardia  - chest and right arm is tender to touch   - CT chest PE: Innumerable bilateral solid pulmonary lung nodules consistent with metastatic disease.  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
[facility] (W[facility])

## 2022-03-05 NOTE — OB PROVIDER TRIAGE NOTE - NS_FHOBSERVED_OBGYN_ALL_OB
The following findings require follow up:  Radiographic finding   Finding: XR chest portable: Trapped left lower lobe with nonreexpansion related small pneumothorax postthoracentesis   Follow up required:  Yes   Follow up should be done within 1 week(s)    Please notify the following clinician to assist with the follow up:   Dr Radha Rodgers DO  PCP - General Internal Medicine  Pediatrics [phone]  [place] [address]     Next Steps: Schedule an appointment as soon as possible for a visit in 5 day(s)      [facility] Cardiology           Next Steps: Call in 1 day(s)      Instructions: Please call your doctor's office in 1-2 days to make an appointment as soon as possible  Bianca Mai MD   Pulmonary Disease  Critical Care Medicine [phone] [address] [address]     Next Steps: Call in 1 day(s)      Instructions: Please call the doctor's office in 1-2 days to make an appointment as needed basis  Yes

## 2022-06-06 ENCOUNTER — EMERGENCY (EMERGENCY)
Facility: HOSPITAL | Age: 32
LOS: 0 days | Discharge: HOME | End: 2022-06-07
Attending: EMERGENCY MEDICINE | Admitting: EMERGENCY MEDICINE
Payer: MEDICAID

## 2022-06-06 VITALS
TEMPERATURE: 97 F | SYSTOLIC BLOOD PRESSURE: 117 MMHG | DIASTOLIC BLOOD PRESSURE: 68 MMHG | OXYGEN SATURATION: 98 % | HEART RATE: 97 BPM | HEIGHT: 63 IN | WEIGHT: 134.92 LBS | RESPIRATION RATE: 19 BRPM

## 2022-06-06 DIAGNOSIS — Z98.890 OTHER SPECIFIED POSTPROCEDURAL STATES: Chronic | ICD-10-CM

## 2022-06-06 DIAGNOSIS — Z98.82 BREAST IMPLANT STATUS: Chronic | ICD-10-CM

## 2022-06-06 DIAGNOSIS — Z98.890 OTHER SPECIFIED POSTPROCEDURAL STATES: ICD-10-CM

## 2022-06-06 DIAGNOSIS — R19.7 DIARRHEA, UNSPECIFIED: ICD-10-CM

## 2022-06-06 DIAGNOSIS — F41.9 ANXIETY DISORDER, UNSPECIFIED: ICD-10-CM

## 2022-06-06 DIAGNOSIS — G51.0 BELL'S PALSY: ICD-10-CM

## 2022-06-06 DIAGNOSIS — R11.2 NAUSEA WITH VOMITING, UNSPECIFIED: ICD-10-CM

## 2022-06-06 DIAGNOSIS — K52.9 NONINFECTIVE GASTROENTERITIS AND COLITIS, UNSPECIFIED: ICD-10-CM

## 2022-06-06 DIAGNOSIS — Z20.822 CONTACT WITH AND (SUSPECTED) EXPOSURE TO COVID-19: ICD-10-CM

## 2022-06-06 DIAGNOSIS — G43.909 MIGRAINE, UNSPECIFIED, NOT INTRACTABLE, WITHOUT STATUS MIGRAINOSUS: ICD-10-CM

## 2022-06-06 DIAGNOSIS — R10.84 GENERALIZED ABDOMINAL PAIN: ICD-10-CM

## 2022-06-06 LAB
ALBUMIN SERPL ELPH-MCNC: 4.6 G/DL — SIGNIFICANT CHANGE UP (ref 3.5–5.2)
ALP SERPL-CCNC: 54 U/L — SIGNIFICANT CHANGE UP (ref 30–115)
ALT FLD-CCNC: 8 U/L — SIGNIFICANT CHANGE UP (ref 0–41)
ANION GAP SERPL CALC-SCNC: 13 MMOL/L — SIGNIFICANT CHANGE UP (ref 7–14)
APPEARANCE UR: CLEAR — SIGNIFICANT CHANGE UP
AST SERPL-CCNC: 11 U/L — SIGNIFICANT CHANGE UP (ref 0–41)
BASOPHILS # BLD AUTO: 0.02 K/UL — SIGNIFICANT CHANGE UP (ref 0–0.2)
BASOPHILS NFR BLD AUTO: 0.3 % — SIGNIFICANT CHANGE UP (ref 0–1)
BILIRUB SERPL-MCNC: 1.2 MG/DL — SIGNIFICANT CHANGE UP (ref 0.2–1.2)
BILIRUB UR-MCNC: ABNORMAL
BUN SERPL-MCNC: 20 MG/DL — SIGNIFICANT CHANGE UP (ref 10–20)
CALCIUM SERPL-MCNC: 9.1 MG/DL — SIGNIFICANT CHANGE UP (ref 8.5–10.1)
CHLORIDE SERPL-SCNC: 100 MMOL/L — SIGNIFICANT CHANGE UP (ref 98–110)
CO2 SERPL-SCNC: 19 MMOL/L — SIGNIFICANT CHANGE UP (ref 17–32)
COLOR SPEC: YELLOW — SIGNIFICANT CHANGE UP
CREAT SERPL-MCNC: 0.7 MG/DL — SIGNIFICANT CHANGE UP (ref 0.7–1.5)
DIFF PNL FLD: NEGATIVE — SIGNIFICANT CHANGE UP
EGFR: 117 ML/MIN/1.73M2 — SIGNIFICANT CHANGE UP
EOSINOPHIL # BLD AUTO: 0.01 K/UL — SIGNIFICANT CHANGE UP (ref 0–0.7)
EOSINOPHIL NFR BLD AUTO: 0.1 % — SIGNIFICANT CHANGE UP (ref 0–8)
FLUAV AG NPH QL: SIGNIFICANT CHANGE UP
FLUBV AG NPH QL: SIGNIFICANT CHANGE UP
GLUCOSE SERPL-MCNC: 98 MG/DL — SIGNIFICANT CHANGE UP (ref 70–99)
GLUCOSE UR QL: NEGATIVE MG/DL — SIGNIFICANT CHANGE UP
HCG SERPL QL: NEGATIVE — SIGNIFICANT CHANGE UP
HCT VFR BLD CALC: 42.5 % — SIGNIFICANT CHANGE UP (ref 37–47)
HGB BLD-MCNC: 13.8 G/DL — SIGNIFICANT CHANGE UP (ref 12–16)
IMM GRANULOCYTES NFR BLD AUTO: 0.3 % — SIGNIFICANT CHANGE UP (ref 0.1–0.3)
KETONES UR-MCNC: 160
LEUKOCYTE ESTERASE UR-ACNC: NEGATIVE — SIGNIFICANT CHANGE UP
LIDOCAIN IGE QN: 26 U/L — SIGNIFICANT CHANGE UP (ref 7–60)
LYMPHOCYTES # BLD AUTO: 0.42 K/UL — LOW (ref 1.2–3.4)
LYMPHOCYTES # BLD AUTO: 5.4 % — LOW (ref 20.5–51.1)
MCHC RBC-ENTMCNC: 26.6 PG — LOW (ref 27–31)
MCHC RBC-ENTMCNC: 32.5 G/DL — SIGNIFICANT CHANGE UP (ref 32–37)
MCV RBC AUTO: 82 FL — SIGNIFICANT CHANGE UP (ref 81–99)
MONOCYTES # BLD AUTO: 0.31 K/UL — SIGNIFICANT CHANGE UP (ref 0.1–0.6)
MONOCYTES NFR BLD AUTO: 4 % — SIGNIFICANT CHANGE UP (ref 1.7–9.3)
NEUTROPHILS # BLD AUTO: 7.06 K/UL — HIGH (ref 1.4–6.5)
NEUTROPHILS NFR BLD AUTO: 89.9 % — HIGH (ref 42.2–75.2)
NITRITE UR-MCNC: NEGATIVE — SIGNIFICANT CHANGE UP
NRBC # BLD: 0 /100 WBCS — SIGNIFICANT CHANGE UP (ref 0–0)
PH UR: 5.5 — SIGNIFICANT CHANGE UP (ref 5–8)
PLATELET # BLD AUTO: 243 K/UL — SIGNIFICANT CHANGE UP (ref 130–400)
POTASSIUM SERPL-MCNC: 3.9 MMOL/L — SIGNIFICANT CHANGE UP (ref 3.5–5)
POTASSIUM SERPL-SCNC: 3.9 MMOL/L — SIGNIFICANT CHANGE UP (ref 3.5–5)
PROT SERPL-MCNC: 6.9 G/DL — SIGNIFICANT CHANGE UP (ref 6–8)
PROT UR-MCNC: NEGATIVE MG/DL — SIGNIFICANT CHANGE UP
RBC # BLD: 5.18 M/UL — SIGNIFICANT CHANGE UP (ref 4.2–5.4)
RBC # FLD: 12.8 % — SIGNIFICANT CHANGE UP (ref 11.5–14.5)
RSV RNA NPH QL NAA+NON-PROBE: SIGNIFICANT CHANGE UP
SARS-COV-2 RNA SPEC QL NAA+PROBE: SIGNIFICANT CHANGE UP
SODIUM SERPL-SCNC: 132 MMOL/L — LOW (ref 135–146)
SP GR SPEC: >=1.03 (ref 1.01–1.03)
UROBILINOGEN FLD QL: 0.2 MG/DL — SIGNIFICANT CHANGE UP
WBC # BLD: 7.84 K/UL — SIGNIFICANT CHANGE UP (ref 4.8–10.8)
WBC # FLD AUTO: 7.84 K/UL — SIGNIFICANT CHANGE UP (ref 4.8–10.8)

## 2022-06-06 PROCEDURE — 99285 EMERGENCY DEPT VISIT HI MDM: CPT

## 2022-06-06 PROCEDURE — 74176 CT ABD & PELVIS W/O CONTRAST: CPT | Mod: 26,MA

## 2022-06-06 RX ORDER — ONDANSETRON 8 MG/1
1 TABLET, FILM COATED ORAL
Qty: 9 | Refills: 0
Start: 2022-06-06 | End: 2022-06-08

## 2022-06-06 RX ORDER — FAMOTIDINE 10 MG/ML
20 INJECTION INTRAVENOUS ONCE
Refills: 0 | Status: COMPLETED | OUTPATIENT
Start: 2022-06-06 | End: 2022-06-06

## 2022-06-06 RX ORDER — ONDANSETRON 8 MG/1
4 TABLET, FILM COATED ORAL ONCE
Refills: 0 | Status: COMPLETED | OUTPATIENT
Start: 2022-06-06 | End: 2022-06-06

## 2022-06-06 RX ORDER — SODIUM CHLORIDE 9 MG/ML
2000 INJECTION INTRAMUSCULAR; INTRAVENOUS; SUBCUTANEOUS ONCE
Refills: 0 | Status: COMPLETED | OUTPATIENT
Start: 2022-06-06 | End: 2022-06-06

## 2022-06-06 RX ADMIN — SODIUM CHLORIDE 2000 MILLILITER(S): 9 INJECTION INTRAMUSCULAR; INTRAVENOUS; SUBCUTANEOUS at 21:19

## 2022-06-06 RX ADMIN — FAMOTIDINE 20 MILLIGRAM(S): 10 INJECTION INTRAVENOUS at 21:19

## 2022-06-06 RX ADMIN — ONDANSETRON 4 MILLIGRAM(S): 8 TABLET, FILM COATED ORAL at 21:19

## 2022-06-06 NOTE — ED PROVIDER NOTE - PATIENT PORTAL LINK FT
You can access the FollowMyHealth Patient Portal offered by [facility] by registering at the following website: [email or web address]. By joining [facility]’s FollowMyHealth portal, you will also be able to view your health information using other applications (apps) compatible with our system.

## 2022-06-06 NOTE — ED PROVIDER NOTE - PHYSICAL EXAMINATION
Physical Exam    Vital Signs: I have reviewed the initial vital signs.  Constitutional: well-nourished, appears stated age, no acute distress  Eyes: Conjunctiva pink, Sclera clear, PERRLA, EOMI.  Cardiovascular: S1 and S2, regular rate, regular rhythm, well-perfused extremities, radial pulses equal and 2+  Respiratory: unlabored respiratory effort, clear to auscultation bilaterally no wheezing, rales and rhonchi  Gastrointestinal: soft, +RLQ Tenderness, no pulsatile mass, normal bowl sounds  Musculoskeletal: supple neck, no lower extremity edema, no midline tenderness  Integumentary: warm, dry, no rash  Neurologic: awake, alert, cranial nerves II-XII grossly intact, extremities’ motor and sensory functions grossly intact  Psychiatric: appropriate mood, appropriate affect

## 2022-06-06 NOTE — ED ADULT NURSE NOTE - OBJECTIVE STATEMENT
as per pt, "sapphire been vomiting and having diarrhea all day today. I feel like I have the chills but I didn't check my temperature. 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

## 2022-06-06 NOTE — ED PROVIDER NOTE - NSICDXFAMILYHX_GEN_ALL_CORE_FT
FAMILY HISTORY:  Grandparent  Still living? Unknown  Family history of migraine, Age at diagnosis: Age Unknown

## 2022-06-06 NOTE — ED PROVIDER NOTE - NS ED ATTENDING STATEMENT MOD
This was a shared visit with the ANT. I reviewed and verified the documentation and independently performed the documented:

## 2022-06-06 NOTE — ED ADULT NURSE NOTE - NSICDXFAMILYHX_GEN_ALL_CORE_FT
FAMILY HISTORY:  Grandparent  Still living? Unknown  Family history of migraine, Age at diagnosis: Age Unknown     Spouse/Patient

## 2022-06-06 NOTE — ED PROVIDER NOTE - NSFOLLOWUPINSTRUCTIONS_ED_ALL_ED_FT
Follow up with your primary care doctor in 1-2 days     Enteritis    WHAT YOU NEED TO KNOW:    Enteritis is inflammation of the small intestine. It may be caused by eating foods or drinking liquids contaminated with a virus, bacteria, or parasites. It may also be caused by certain medicines, damage from radiation, and medical conditions such as Crohn disease.     DISCHARGE INSTRUCTIONS:    Return to the emergency department if:     You cannot stop vomiting.      You have not urinated for 12 hours.     Contact your healthcare provider if:     You have a fever over 101.5.       You have blood or mucus in your bowel movements.       You continue to vomit or have diarrhea for more than 3 days, even after treatment.      You have a dry mouth and eyes, you are urinating less than usual, and you feel dizzy when you stand up.       Your mouth or eyes are dry. You are not urinating as much or as often.      You are losing weight without trying.      You have questions or concerns about your condition or care.    Medicines:     Medicines may be given to fight an infection caused by bacteria or a parasite. You may also need medicines to slow or stop your diarrhea or vomiting. Do not take these medicines unless your healthcare provider say it is okay. Other medicines may be needed to treat medical conditions that are causing enteritis.       Take your medicine as directed. Contact your healthcare provider if you think your medicine is not helping or if you have side effects. Tell him of her if you are allergic to any medicine. Keep a list of the medicines, vitamins, and herbs you take. Include the amounts, and when and why you take them. Bring the list or the pill bottles to follow-up visits. Carry your medicine list with you in case of an emergency.    Manage enteritis:     Eat foods that help to decrease symptoms. Limit or avoid foods and liquids that are high in sugar, fat, and fiber to help relieve diarrhea. It may be helpful to avoid lactose. Lactose is a type of sugar that is found in milk products. You may be able to tolerate soups, broths, well-cooked vegetables, canned fruit, and baked or broiled meats. Ask your dietitian or healthcare provider if you should follow a special diet. You may need to avoid other foods if you have certain medical conditions such as celiac disease.       Drink liquids as directed. Ask how much liquid to drink each day and which liquids are best for you. It is important to prevent or treat dehydration. Even if you have been vomiting, suck on ice chips or take small sips of clear liquids often. Slowly increase the amount of clear liquids you drink. If you become dehydrated, you may need IV liquids.      Drink an oral rehydration solution (ORS) as directed. An ORS contains water, salts, and sugar that are needed to replace lost body fluids. Ask what kind of ORS to use, how much to drink, and where to get it.    Prevent enteritis: Enteritis that is caused by bacteria, parasites, or viruses can be prevented. The following may help to prevent this type of enteritis:    Wash your hands often. Use soap and water. Wash your hands after you use the bathroom, change a child's diapers, or sneeze. Wash your hands before you prepare or eat food. Handwashing           Clean surfaces and do laundry often. Wash your clothes and towels separately from the rest of the laundry. Clean surfaces in your home with antibacterial  or bleach.      Clean food thoroughly and cook safely. Wash raw vegetables before you cook. Cook meat, fish, and eggs fully. Do not use the same dishes for raw meat as you do for other foods. Refrigerate any leftover food immediately.      Be aware when you camp or travel. Drink only clean water. Do not drink from rivers or lakes unless you purify or boil the water first. When you travel, drink bottled water and do not add ice. Do not eat fruit that has not been peeled. Do not eat raw fish or meat that is not fully cooked.     Follow up with your healthcare provider as directed: Write down your questions so you remember to ask them during your visits.        © Copyright [employer] 2019 All illustrations and images included in CareNotes are the copyrighted property of [employer] or [employer].

## 2022-06-06 NOTE — ED PROVIDER NOTE - CLINICAL SUMMARY MEDICAL DECISION MAKING FREE TEXT BOX
32yF no pmhx pw nvd today  generalized abd pain - abd exam   generalized  soft no rebound or guarding.  CT ap   enteritis  labs reviewed, opt feels much better  tolerating PO   Patient to be discharged from ED in well appearing condition. Any available test results were discussed with and printed  for patient.  Verbal instructions given, including instructions to return to ED immediately for any new, worsening, or concerning symptoms. Limitations of ED work up discussed.  Patient reports understanding of above with capacity and insight. Written discharge instructions additionally given, including follow-up plan.

## 2022-06-07 VITALS
OXYGEN SATURATION: 97 % | DIASTOLIC BLOOD PRESSURE: 58 MMHG | TEMPERATURE: 98 F | SYSTOLIC BLOOD PRESSURE: 119 MMHG | HEART RATE: 101 BPM

## 2022-08-21 NOTE — ED ADULT TRIAGE NOTE - AS HEIGHT TYPE
DISPLAY PLAN FREE TEXT DISPLAY PLAN FREE TEXT DISPLAY PLAN FREE TEXT stated DISPLAY PLAN FREE TEXT DISPLAY PLAN FREE TEXT DISPLAY PLAN FREE TEXT

## 2023-05-24 NOTE — ED ADULT TRIAGE NOTE - DOMESTIC TRAVEL HIGH RISK QUESTION
Is the patient currently in the state of [place]? YES    Visit mode:VIDEO    If the visit is dropped, the patient can be reconnected by: VIDEO VISIT: Send to e-mail at: [email or web address]    Will anyone else be joining the visit? NO      How would you like to obtain your AVS? MyChart    Are changes needed to the allergy or medication list? NO    Reason for visit: Consult      Katey Dudley VF         No

## 2023-09-11 NOTE — OB PROVIDER TRIAGE NOTE - PRETERM DELIVERIES, OB PROFILE
0 Minoxidil Pregnancy And Lactation Text: This medication has not been assigned a Pregnancy Risk Category but animal studies failed to show danger with the topical medication. It is unknown if the medication is excreted in breast milk.

## 2024-08-02 PROBLEM — Z86.69 HISTORY OF BELL'S PALSY: Status: RESOLVED | Noted: 2019-12-27 | Resolved: 2024-08-02

## 2025-07-08 ENCOUNTER — LABORATORY RESULT (OUTPATIENT)
Age: 35
End: 2025-07-08

## 2025-07-08 ENCOUNTER — NON-APPOINTMENT (OUTPATIENT)
Age: 35
End: 2025-07-08

## 2025-07-08 ENCOUNTER — APPOINTMENT (OUTPATIENT)
Dept: SURGERY | Facility: CLINIC | Age: 35
End: 2025-07-08
Payer: COMMERCIAL

## 2025-07-08 ENCOUNTER — TRANSCRIPTION ENCOUNTER (OUTPATIENT)
Age: 35
End: 2025-07-08

## 2025-07-08 VITALS
BODY MASS INDEX: 22.68 KG/M2 | TEMPERATURE: 97 F | DIASTOLIC BLOOD PRESSURE: 76 MMHG | WEIGHT: 128 LBS | OXYGEN SATURATION: 98 % | SYSTOLIC BLOOD PRESSURE: 118 MMHG | HEIGHT: 63 IN | HEART RATE: 102 BPM

## 2025-07-08 PROCEDURE — 46050 I&D PERIANAL ABSCESS SUPFC: CPT

## 2025-07-08 PROCEDURE — 99203 OFFICE O/P NEW LOW 30 MIN: CPT | Mod: 25

## 2025-07-09 ENCOUNTER — APPOINTMENT (OUTPATIENT)
Dept: SURGERY | Facility: CLINIC | Age: 35
End: 2025-07-09

## 2025-07-11 ENCOUNTER — NON-APPOINTMENT (OUTPATIENT)
Age: 35
End: 2025-07-11

## 2025-07-15 PROBLEM — K61.0 PERIANAL ABSCESS: Status: ACTIVE | Noted: 2025-07-11

## 2025-07-23 ENCOUNTER — APPOINTMENT (OUTPATIENT)
Dept: SURGERY | Facility: CLINIC | Age: 35
End: 2025-07-23
Payer: COMMERCIAL

## 2025-07-23 VITALS
HEART RATE: 95 BPM | SYSTOLIC BLOOD PRESSURE: 110 MMHG | BODY MASS INDEX: 22.68 KG/M2 | DIASTOLIC BLOOD PRESSURE: 80 MMHG | WEIGHT: 128 LBS | OXYGEN SATURATION: 99 % | HEIGHT: 63 IN

## 2025-07-23 DIAGNOSIS — K60.30 ANAL FISTULA, UNSPECIFIED: ICD-10-CM

## 2025-07-23 PROCEDURE — 99214 OFFICE O/P EST MOD 30 MIN: CPT

## 2025-08-05 ENCOUNTER — RESULT REVIEW (OUTPATIENT)
Age: 35
End: 2025-08-05

## 2025-08-05 ENCOUNTER — OUTPATIENT (OUTPATIENT)
Dept: OUTPATIENT SERVICES | Facility: HOSPITAL | Age: 35
LOS: 1 days | End: 2025-08-05
Payer: COMMERCIAL

## 2025-08-05 DIAGNOSIS — Z00.8 ENCOUNTER FOR OTHER GENERAL EXAMINATION: ICD-10-CM

## 2025-08-05 DIAGNOSIS — K60.30 ANAL FISTULA, UNSPECIFIED: ICD-10-CM

## 2025-08-05 DIAGNOSIS — Z98.82 BREAST IMPLANT STATUS: Chronic | ICD-10-CM

## 2025-08-05 DIAGNOSIS — Z98.890 OTHER SPECIFIED POSTPROCEDURAL STATES: Chronic | ICD-10-CM

## 2025-08-05 PROCEDURE — 72197 MRI PELVIS W/O & W/DYE: CPT | Mod: 26

## 2025-08-05 PROCEDURE — 72197 MRI PELVIS W/O & W/DYE: CPT

## 2025-08-05 PROCEDURE — A9579: CPT

## 2025-08-06 DIAGNOSIS — K60.30 ANAL FISTULA, UNSPECIFIED: ICD-10-CM

## 2025-08-08 ENCOUNTER — APPOINTMENT (OUTPATIENT)
Dept: RHEUMATOLOGY | Facility: CLINIC | Age: 35
End: 2025-08-08
Payer: COMMERCIAL

## 2025-08-08 ENCOUNTER — LABORATORY RESULT (OUTPATIENT)
Age: 35
End: 2025-08-08

## 2025-08-08 VITALS
TEMPERATURE: 98.2 F | HEART RATE: 105 BPM | WEIGHT: 130 LBS | SYSTOLIC BLOOD PRESSURE: 110 MMHG | OXYGEN SATURATION: 98 % | BODY MASS INDEX: 23.04 KG/M2 | DIASTOLIC BLOOD PRESSURE: 72 MMHG | HEIGHT: 63 IN

## 2025-08-08 DIAGNOSIS — H20.9 UNSPECIFIED IRIDOCYCLITIS: ICD-10-CM

## 2025-08-08 PROCEDURE — 99204 OFFICE O/P NEW MOD 45 MIN: CPT

## 2025-08-08 RX ORDER — PREDNISONE 10 MG/1
10 TABLET ORAL
Refills: 0 | Status: ACTIVE | COMMUNITY

## 2025-08-08 RX ORDER — FOLIC ACID 1 MG/1
1 TABLET ORAL DAILY
Qty: 30 | Refills: 3 | Status: ACTIVE | COMMUNITY
Start: 2025-08-08 | End: 1900-01-01

## 2025-08-08 RX ORDER — PREDNISONE 50 MG/1
50 TABLET ORAL
Refills: 0 | Status: ACTIVE | COMMUNITY

## 2025-08-08 RX ORDER — OMEGA-3-ACID ETHYL ESTERS CAPSULES 1 G/1
1 CAPSULE, LIQUID FILLED ORAL
Refills: 0 | Status: ACTIVE | COMMUNITY

## 2025-08-08 RX ORDER — MILK THISTLE 150 MG
CAPSULE ORAL
Refills: 0 | Status: ACTIVE | COMMUNITY

## 2025-08-08 RX ORDER — UBIDECARENONE/VIT E ACET 100MG-5
1000 CAPSULE ORAL
Refills: 0 | Status: ACTIVE | COMMUNITY

## 2025-08-08 RX ORDER — B-COMPLEX WITH VITAMIN C
TABLET ORAL
Refills: 0 | Status: ACTIVE | COMMUNITY

## 2025-08-08 RX ORDER — BACILLUS COAGULANS/INULIN 1B-250 MG
CAPSULE ORAL
Refills: 0 | Status: ACTIVE | COMMUNITY

## 2025-08-08 RX ORDER — METHOTREXATE 2.5 MG/1
2.5 TABLET ORAL
Qty: 24 | Refills: 3 | Status: ACTIVE | COMMUNITY
Start: 2025-08-08 | End: 1900-01-01

## 2025-08-12 LAB
ALBUMIN SERPL ELPH-MCNC: 4.6 G/DL
ALP BLD-CCNC: 51 U/L
ALT SERPL-CCNC: 11 U/L
ANION GAP SERPL CALC-SCNC: 12 MMOL/L
APPEARANCE: CLEAR
AST SERPL-CCNC: 6 U/L
BACTERIA: ABNORMAL /HPF
BASOPHILS # BLD AUTO: 0.03 K/UL
BASOPHILS NFR BLD AUTO: 0.2 %
BILIRUB SERPL-MCNC: 0.5 MG/DL
BILIRUBIN URINE: NEGATIVE
BLOOD URINE: NEGATIVE
BUN SERPL-MCNC: 14 MG/DL
CALCIUM SERPL-MCNC: 9.9 MG/DL
CAST: 0 /LPF
CHLORIDE SERPL-SCNC: 96 MMOL/L
CO2 SERPL-SCNC: 24 MMOL/L
COLOR: YELLOW
CREAT SERPL-MCNC: 0.8 MG/DL
CRP SERPL-MCNC: <3 MG/L
EGFRCR SERPLBLD CKD-EPI 2021: 98 ML/MIN/1.73M2
ENA SS-A AB SER-ACNC: <0.2 AL
ENA SS-B AB SER-ACNC: 0.6 AL
EOSINOPHIL # BLD AUTO: 0 K/UL
EOSINOPHIL NFR BLD AUTO: 0 %
EPITHELIAL CELLS: 2 /HPF
GLUCOSE QUALITATIVE U: 250 MG/DL
GLUCOSE SERPL-MCNC: 218 MG/DL
HCT VFR BLD CALC: 42.4 %
HGB BLD-MCNC: 13.4 G/DL
IMM GRANULOCYTES NFR BLD AUTO: 0.3 %
KETONES URINE: NEGATIVE MG/DL
LEUKOCYTE ESTERASE URINE: ABNORMAL
LYMPHOCYTES # BLD AUTO: 0.72 K/UL
LYMPHOCYTES NFR BLD AUTO: 5 %
MAN DIFF?: NORMAL
MCHC RBC-ENTMCNC: 28 PG
MCHC RBC-ENTMCNC: 31.6 G/DL
MCV RBC AUTO: 88.5 FL
MICROSCOPIC-UA: NORMAL
MONOCYTES # BLD AUTO: 0.15 K/UL
MONOCYTES NFR BLD AUTO: 1 %
NEUTROPHILS # BLD AUTO: 13.41 K/UL
NEUTROPHILS NFR BLD AUTO: 93.5 %
NITRITE URINE: NEGATIVE
PH URINE: 7
PLATELET # BLD AUTO: 370 K/UL
PMV BLD AUTO: 0 /100 WBCS
PMV BLD: 10.8 FL
POTASSIUM SERPL-SCNC: 4.6 MMOL/L
PROT SERPL-MCNC: 6.8 G/DL
PROTEIN URINE: NEGATIVE MG/DL
R RICKETTSI IGG CSF-ACNC: NORMAL
R RICKETTSI IGM CSF-ACNC: NORMAL
RBC # BLD: 4.79 M/UL
RBC # FLD: 13.5 %
RED BLOOD CELLS URINE: 1 /HPF
ROCKY MT SPOTTED FEVER COMMENT: NORMAL
SMOOTH MUSCLE AB SER QL IF: ABNORMAL
SODIUM SERPL-SCNC: 132 MMOL/L
SPECIFIC GRAVITY URINE: 1.01
T PALLIDUM AB SER QL IA: NEGATIVE
UROBILINOGEN URINE: 0.2 MG/DL
WBC # FLD AUTO: 14.35 K/UL
WHITE BLOOD CELLS URINE: 1 /HPF
WNV IGG SPEC QL: NEGATIVE
WNV IGM SPEC QL: NEGATIVE

## 2025-09-12 ENCOUNTER — APPOINTMENT (OUTPATIENT)
Dept: SURGERY | Facility: AMBULATORY SURGERY CENTER | Age: 35
End: 2025-09-12

## 2025-09-19 ENCOUNTER — APPOINTMENT (OUTPATIENT)
Dept: RHEUMATOLOGY | Facility: CLINIC | Age: 35
End: 2025-09-19
Payer: COMMERCIAL

## 2025-09-19 VITALS
OXYGEN SATURATION: 98 % | TEMPERATURE: 98.1 F | DIASTOLIC BLOOD PRESSURE: 70 MMHG | WEIGHT: 130 LBS | BODY MASS INDEX: 23.04 KG/M2 | SYSTOLIC BLOOD PRESSURE: 112 MMHG | HEIGHT: 63 IN | HEART RATE: 99 BPM

## 2025-09-19 DIAGNOSIS — Z00.00 ENCOUNTER FOR GENERAL ADULT MEDICAL EXAMINATION W/OUT ABNORMAL FINDINGS: ICD-10-CM

## 2025-09-19 PROCEDURE — 99214 OFFICE O/P EST MOD 30 MIN: CPT

## 2025-09-24 LAB
ALBUMIN SERPL ELPH-MCNC: 4.2 G/DL
ALP BLD-CCNC: 57 U/L
ALT SERPL-CCNC: 13 U/L
ANION GAP SERPL CALC-SCNC: 13 MMOL/L
AST SERPL-CCNC: 8 U/L
BASOPHILS # BLD AUTO: 0.04 K/UL
BASOPHILS NFR BLD AUTO: 0.3 %
BILIRUB SERPL-MCNC: 0.2 MG/DL
BUN SERPL-MCNC: 15 MG/DL
CALCIUM SERPL-MCNC: 9.3 MG/DL
CHLORIDE SERPL-SCNC: 102 MMOL/L
CO2 SERPL-SCNC: 25 MMOL/L
CREAT SERPL-MCNC: 0.7 MG/DL
CRP SERPL-MCNC: <3 MG/L
EGFRCR SERPLBLD CKD-EPI 2021: 115 ML/MIN/1.73M2
EOSINOPHIL # BLD AUTO: 0.06 K/UL
EOSINOPHIL NFR BLD AUTO: 0.4 %
ERYTHROCYTE [SEDIMENTATION RATE] IN BLOOD BY WESTERGREN METHOD: 3 MM/HR
ESTIMATED AVERAGE GLUCOSE: 105 MG/DL
GLUCOSE SERPL-MCNC: 171 MG/DL
HBA1C MFR BLD HPLC: 5.3 %
HCT VFR BLD CALC: 42.8 %
HGB BLD-MCNC: 13.2 G/DL
IMM GRANULOCYTES NFR BLD AUTO: 0.5 %
LYMPHOCYTES # BLD AUTO: 2.24 K/UL
LYMPHOCYTES NFR BLD AUTO: 14.5 %
MAN DIFF?: NORMAL
MCHC RBC-ENTMCNC: 28.6 PG
MCHC RBC-ENTMCNC: 30.8 G/DL
MCV RBC AUTO: 92.6 FL
MONOCYTES # BLD AUTO: 0.25 K/UL
MONOCYTES NFR BLD AUTO: 1.6 %
NEUTROPHILS # BLD AUTO: 12.81 K/UL
NEUTROPHILS NFR BLD AUTO: 82.7 %
PLATELET # BLD AUTO: 433 K/UL
PMV BLD AUTO: 0 /100 WBCS
PMV BLD: 9.8 FL
POTASSIUM SERPL-SCNC: 4 MMOL/L
PROT SERPL-MCNC: 6.6 G/DL
RBC # BLD: 4.62 M/UL
RBC # FLD: 14.6 %
SODIUM SERPL-SCNC: 140 MMOL/L
WBC # FLD AUTO: 15.47 K/UL